# Patient Record
Sex: FEMALE | HISPANIC OR LATINO | URBAN - METROPOLITAN AREA
[De-identification: names, ages, dates, MRNs, and addresses within clinical notes are randomized per-mention and may not be internally consistent; named-entity substitution may affect disease eponyms.]

---

## 2021-06-15 ENCOUNTER — TRANSFERRED RECORDS (OUTPATIENT)
Dept: HEALTH INFORMATION MANAGEMENT | Facility: CLINIC | Age: 36
End: 2021-06-15

## 2022-08-30 ENCOUNTER — TRANSFERRED RECORDS (OUTPATIENT)
Dept: MULTI SPECIALTY CLINIC | Facility: CLINIC | Age: 37
End: 2022-08-30

## 2022-08-30 LAB
HPV ABSTRACT: NORMAL
PAP-ABSTRACT: NORMAL

## 2024-02-27 ENCOUNTER — OFFICE VISIT (OUTPATIENT)
Dept: FAMILY MEDICINE | Facility: CLINIC | Age: 39
End: 2024-02-27
Payer: COMMERCIAL

## 2024-02-27 VITALS
HEIGHT: 66 IN | RESPIRATION RATE: 15 BRPM | HEART RATE: 63 BPM | WEIGHT: 280 LBS | TEMPERATURE: 98.1 F | OXYGEN SATURATION: 97 % | BODY MASS INDEX: 45 KG/M2 | DIASTOLIC BLOOD PRESSURE: 80 MMHG | SYSTOLIC BLOOD PRESSURE: 118 MMHG

## 2024-02-27 DIAGNOSIS — B35.1 TOENAIL FUNGUS: ICD-10-CM

## 2024-02-27 DIAGNOSIS — E06.3 HYPOTHYROIDISM DUE TO HASHIMOTO'S THYROIDITIS: Primary | ICD-10-CM

## 2024-02-27 DIAGNOSIS — G43.109 MIGRAINE WITH AURA AND WITHOUT STATUS MIGRAINOSUS, NOT INTRACTABLE: ICD-10-CM

## 2024-02-27 DIAGNOSIS — J30.2 SEASONAL ALLERGIC RHINITIS, UNSPECIFIED TRIGGER: ICD-10-CM

## 2024-02-27 DIAGNOSIS — Z76.89 ENCOUNTER TO ESTABLISH CARE: ICD-10-CM

## 2024-02-27 DIAGNOSIS — Z87.898 HISTORY OF PREDIABETES: ICD-10-CM

## 2024-02-27 LAB
CHOLEST SERPL-MCNC: 160 MG/DL
FASTING STATUS PATIENT QL REPORTED: ABNORMAL
HBA1C MFR BLD: 5.7 % (ref 0–5.6)
HDLC SERPL-MCNC: 48 MG/DL
LDLC SERPL CALC-MCNC: 92 MG/DL
NONHDLC SERPL-MCNC: 112 MG/DL
T4 FREE SERPL-MCNC: 1.34 NG/DL (ref 0.9–1.7)
TRIGL SERPL-MCNC: 99 MG/DL
TSH SERPL DL<=0.005 MIU/L-ACNC: 0.23 UIU/ML (ref 0.3–4.2)

## 2024-02-27 PROCEDURE — 83036 HEMOGLOBIN GLYCOSYLATED A1C: CPT

## 2024-02-27 PROCEDURE — 84443 ASSAY THYROID STIM HORMONE: CPT

## 2024-02-27 PROCEDURE — 36415 COLL VENOUS BLD VENIPUNCTURE: CPT

## 2024-02-27 PROCEDURE — 84439 ASSAY OF FREE THYROXINE: CPT

## 2024-02-27 PROCEDURE — 99204 OFFICE O/P NEW MOD 45 MIN: CPT | Mod: GC

## 2024-02-27 PROCEDURE — 80061 LIPID PANEL: CPT

## 2024-02-27 RX ORDER — LEVOTHYROXINE SODIUM 150 UG/1
150 TABLET ORAL DAILY
Qty: 30 TABLET | Refills: 2 | Status: SHIPPED | OUTPATIENT
Start: 2024-02-27 | End: 2024-03-01 | Stop reason: DRUGHIGH

## 2024-02-27 RX ORDER — UREA 200 MG/G
CREAM TOPICAL DAILY
Qty: 120 G | Refills: 2 | Status: SHIPPED | OUTPATIENT
Start: 2024-02-27

## 2024-02-27 RX ORDER — LORATADINE 10 MG/1
10 TABLET ORAL DAILY
Qty: 90 TABLET | Refills: 3 | Status: SHIPPED | OUTPATIENT
Start: 2024-02-27

## 2024-02-27 RX ORDER — FLUTICASONE PROPIONATE 50 MCG
1 SPRAY, SUSPENSION (ML) NASAL DAILY
Qty: 18.2 ML | Refills: 11 | Status: SHIPPED | OUTPATIENT
Start: 2024-02-27

## 2024-02-27 NOTE — PATIENT INSTRUCTIONS
Patient Education   Here is the plan from today's visit    1. Encounter to establish care      2. Hypothyroidism due to Hashimoto's thyroiditis  - TSH today   - Will send you prescription    3. Seasonal allergic rhinitis, unspecified trigger    - loratadine (CLARITIN) 10 MG tablet; Take 1 tablet (10 mg) by mouth daily  Dispense: 90 tablet; Refill: 3  - fluticasone (FLONASE) 50 MCG/ACT nasal spray; Spray 1 spray into both nostrils daily  Dispense: 18.2 mL; Refill: 11    4. Toenail fungus  Apply a thick layer of topical urea 40% cream or ointment to the nail    ?Occlude the nail with a bandage or tape and leave on overnight    ?Wash the topical urea off with soap and water in the morning    ?Repeat this procedure nightly until the nail softens and can be easily clipped or debrided      5. Migraine with aura and without status migrainosus, not intractable        Please call or return to clinic if your symptoms don't go away.    Follow up plan  No follow-ups on file.    Thank you for coming to [facility] today.  Lab Testing:  **If you had lab testing today and your results are reassuring or normal they will be mailed to you or sent through MiCarga within 7 days.   **If the lab tests need quick action we will call you with the results.  **If you are having labs done on a different day, please call [phone] to schedule at [facility] or [phone] for other [facility] Outpatient Lab locations. Labs do not offer walk-in appointments.  The phone number we will call with results is # [phone] (home) . If this is not the best number please call our clinic and change the number.  Medication Refills:  If you need any refills please call your pharmacy and they will contact us.   If you need to  your refill at a new pharmacy, please contact the new pharmacy directly. The new pharmacy will help you get your medications transferred faster.   Scheduling:  If you have any concerns about today's visit or  wish to schedule another appointment please call our office during normal business hours [phone] (8-5:00 M-F). If you can no longer make a scheduled visit, please cancel via Modiv Media or call us to cancel.   If a referral was made to an [facility] specialty provider and you do not get a call from central scheduling, please refer to directions on your visit summary or call our office during normal business hours for assistance.   If a Mammogram was ordered for you at the Breast Center call [phone] to schedule or change your appointment.  If you had an XRay/CT/Ultrasound/MRI ordered the number is [phone] to schedule or change your radiology appointment.   [facility] has limited ultrasound appointments available on Wednesdays, if you would like your ultrasound at [facility], please call [phone] to schedule.   Medical Concerns:  If you have urgent medical concerns please call [phone] at any time of the day.    Paula Schwartz, DO

## 2024-02-27 NOTE — COMMUNITY RESOURCES LIST (ENGLISH)
02/27/2024   [facility]  N/A  For questions about this resource list or additional care needs, please contact your primary care clinic or care manager.  Phone: [phone]   Email: N/A   Address: [address] [place] [id number]   Hours: N/A        Food and Nutrition       Food pantry  1  [facility] Distance: 0.75 miles      In-Person   [address] [place] [address]  Language: English  Hours: Mon - Thu 1:00 PM - 4:30 PM , Fri 1:30 PM - 3:00 PM  Fees: Free   Phone: (362) 747-2971 Email: Roni@[email or web address]. Website: https://www.[employer]/     2  [facility] Distance: 0.86 miles      [facility]   [address] [place] [address]  Language: English  Hours: Tue 5:00 PM - 6:30 PM  Fees: Free   Phone: (624) 657-3609 Email: office@[facility].Tower59 Website: http://www.Nexidia.Tower59/     SNAP application assistance  3  [facility] - Office of Multicultural Services Distance: 0.53 miles      Phone/Virtual   [address] [place] [address]  Language: American Sign Language, Polish, Chinese, English, Syriac, Sinhala, Oromo, Bulgarian, Barbadian, Angolan, Swahili, Slovenian, Japanese  Hours: Mon - Tue 9:00 AM - 4:00 PM , Wed 10:00 AM - 5:00 PM , Thu - Fri 9:00 AM - 4:00 PM  Fees: Free   Phone: (323) 262-2293 Email: oms@[employer]. Website: http://www.[facility]/residents/human-services/multi-cultural-services     4  [employer] Distance: 1.42 miles      In-Person   [address] [place] [address]  Language: English, Angolan  Hours: Mon - Fri 8:30 AM - 5:00 PM  Fees: Free   Phone: (400) 131-5768 Email: info@clues.org Website: http://www.clues.org/     Soup kitchen or free meals  5  [facility] [place] [facility] [place] [facility] [place] Distance: 0.91 miles      In-Person   [address] [place] [address]  Language: English  Hours: Mon - Wed 12:00 PM - 1:00 PM  Fees: Free    Phone: (536) 500-2898 Email: asha@[facility].org Website: https://centralusa.[facility] Center[facility].org/[facility]/Adonis/     6  [facility] [place] [facility] [place] Distance: 1.38 miles      [facility]   [address] [place] [address]  Language: English, Albanian  Hours: Mon - Thu 5:15 PM - 6:15 PM  Fees: Free   Phone: (326) 952-7525 Ext.214 Email: gngghsaqfd1382@Synker Website: http://www.Hypemarks.org/          Important Numbers & Websites       Emergency Services   911  [facility]   311  Poison Control   (378) 783-3315  Suicide Prevention Lifeline   (536) 616-6353 (TALK)  Child Abuse Hotline   (586) 657-2746 (4-A-Child)  Sexual Assault Hotline   (369) 483-3780 (HOPE)  National Runaway Safeline   (182) 626-8029 (RUNAWAY)  All-Options Talkline   (600) 125-3484  Substance Abuse Referral   (530) 493-2650 (HELP)

## 2024-02-27 NOTE — PROGRESS NOTES
"    Assessment & Plan     Encounter to establish care  Here to establish care, heard about [facility] through her girlfriend.    Hypothyroidism due to Hashimoto's thyroiditis  History of prediabetes  Longstanding history of hypothyroidism verified by chart review current dose is 150 mcg of levothyroxine.  Patient has not had a TSH in 3-year.  Plan to check TSH along with lipids and A1c due to comorbidities.  No thyromegaly on exam.  - TSH with free T4 reflex; Future  - Lipid panel reflex to direct LDL Non-fasting; Future  - Hemoglobin A1c; Future  - levothyroxine (SYNTHROID/LEVOTHROID) 150 MCG tablet; Take 1 tablet (150 mcg) by mouth daily    Seasonal allergic rhinitis, unspecified trigger  Chronic history of seasonal allergies with good response to Claritin and Flonase as needed.  - loratadine (CLARITIN) 10 MG tablet; Take 1 tablet (10 mg) by mouth daily  - fluticasone (FLONASE) 50 MCG/ACT nasal spray; Spray 1 spray into both nostrils daily    Toenail fungus  Chronic onychomycosis of right second toe, appear discoloration and trophic nail changes, associated pain at times..  Advised patient to use topical urea cream daily at night and cover with a Band-Aid to soften nail bed in order to cut it.  Instructions provided in AVS.  Provided information about terbinafine to patient, will consider that treatment option if failure with conservative measures.  - urea (GORMEL) 20 % external cream; Apply topically daily    Migraine with aura and without status migrainosus, not intractable  Longstanding history of migraines with aura that is responsive to NSAIDs, triptans, and sleep.  Triggers include stress and possibly orgasms.  Last migraine was couple months ago and lasted for 1 day.  Has not had migraines since.  Does not require refills for triptan or NSAIDs.    BMI  Estimated body mass index is 45.19 kg/m  as calculated from the following:    Height as of this encounter: 1.676 m (5' 6\").    Weight as of this " "encounter: 127 kg (280 lb).   Weight management plan: Patient was given information about weight-neutral RD         Return for Follow up, Routine preventive, with me next avail.    Melanie Catalan is a 38 year old, presenting for the following health issues:  Establish Care (Refills)        2/27/2024    10:06 AM   Additional Questions   Roomed by Brendain   Accompanied by Self         2/27/2024    Information    services provided? No     Via the Health Maintenance questionnaire, the patient has reported the following services have been completed -Cervical Cancer Screening, this information has been sent to the abstraction team.  Hypothyroidism  - 150 mcg of Levothyroxine has been taking this for years( started at 86, numerous dose adjustments)   - 2012, after birth of child   - fatigue, gets cold frequently  - denies hair loss, diarrhea, constipation  - takes medication daily on empty stomach, waits 30 mins before eating, and infrequently misses doses.     Migraines   - Associated with pain   - With aura (few months ago, treated with triptans)  - Mild, every months can be debilitating, but is responsive to medication and sleep   - Orgasm with migraine   - \"few and far between\"   - has triptans  - follows with OP neurologist rarely.    Varicose Veins   - Hx of DVT after birth of child - 2016.   - 1 time shot of anticoagulant, did not require any PO anticoagulation after    Toenail Fungus   - long standing hx   - thickened toe nail on R 2nd toe   - can be painful sometimes   - hasn't tried OTC     Family life   - Is    - Polyamorous  -  with a partner and has a girlfriend separately     [facility]  - Partial Day program  - 8 weeks  - Binge eating disorder  - Has group therapy with them 1 time/week     Birth Control   - Has norethindrone has been taking it chronically   - Does not require refills         Review of Systems  Constitutional, HEENT, cardiovascular, pulmonary, GI, " ", musculoskeletal, neuro, skin, endocrine and psych systems are negative, except as otherwise noted.      Objective    /80 (BP Location: Left arm, Patient Position: Sitting, Cuff Size: Adult Large)   Pulse 63   Temp 98.1  F (36.7  C)   Resp 15   Ht 1.676 m (5' 6\")   Wt 127 kg (280 lb)   LMP 02/25/2024 (Exact Date)   SpO2 97%   BMI 45.19 kg/m    Body mass index is 45.19 kg/m .  Physical Exam  HENT:      Head: Normocephalic and atraumatic.      Nose: Congestion present.      Mouth/Throat:      Mouth: Mucous membranes are moist.   Neck:      Thyroid: No thyromegaly.   Cardiovascular:      Rate and Rhythm: Normal rate and regular rhythm.   Pulmonary:      Effort: Pulmonary effort is normal.      Breath sounds: Normal breath sounds.   Musculoskeletal:      Cervical back: Neck supple.   Lymphadenopathy:      Cervical: No cervical adenopathy.   Skin:     General: Skin is warm.      Capillary Refill: Capillary refill takes less than 2 seconds.   Neurological:      General: No focal deficit present.      Mental Status: She is alert.      Gait: Gait is intact.   Psychiatric:         Mood and Affect: Mood normal.         Behavior: Behavior normal.         Thought Content: Thought content normal.         Judgment: Judgment normal.            Signed Electronically by: Paula Schwartz DO"

## 2024-02-27 NOTE — PROGRESS NOTES
Preceptor Attestation:   Patient seen, evaluated and discussed with the resident. I have verified the content of the note, which accurately reflects my assessment of the patient and the plan of care.   Supervising Physician:  Dewey Schmitt MD

## 2024-03-01 DIAGNOSIS — E06.3 HYPOTHYROIDISM DUE TO HASHIMOTO'S THYROIDITIS: Primary | ICD-10-CM

## 2024-03-01 RX ORDER — LEVOTHYROXINE SODIUM 137 UG/1
137 TABLET ORAL DAILY
Qty: 42 TABLET | Refills: 0 | Status: SHIPPED | OUTPATIENT
Start: 2024-03-01 | End: 2024-04-26

## 2024-03-01 NOTE — RESULT ENCOUNTER NOTE
Dear Alayna  Here are your result(s). Your lipid panel looks normal. Your good cholestenol is a little low which is common among people who have hypothyroidism. It is not too low where we would consider medication. Your lab results indicate that you have prediabetes. We can talk about what this means more at your next visit and think about if medication is right for you. Let me know if you have any questions.   Paula

## 2024-03-26 ENCOUNTER — OFFICE VISIT (OUTPATIENT)
Dept: FAMILY MEDICINE | Facility: CLINIC | Age: 39
End: 2024-03-26
Payer: COMMERCIAL

## 2024-03-26 VITALS
HEART RATE: 68 BPM | DIASTOLIC BLOOD PRESSURE: 78 MMHG | RESPIRATION RATE: 16 BRPM | OXYGEN SATURATION: 97 % | TEMPERATURE: 97.7 F | SYSTOLIC BLOOD PRESSURE: 110 MMHG | BODY MASS INDEX: 45.19 KG/M2 | HEIGHT: 66 IN

## 2024-03-26 DIAGNOSIS — R59.9 ENLARGED LYMPH NODES: Primary | ICD-10-CM

## 2024-03-26 PROCEDURE — 99213 OFFICE O/P EST LOW 20 MIN: CPT | Mod: GC

## 2024-03-26 NOTE — PROGRESS NOTES
"  Assessment & Plan     Enlarged lymph nodes  Month long hx of noted lymph node that is neither painful nor enlarging per hx - known illness in recent hx. On exam singular shoddy lymph node noted along R SCM that is mobile, non tender to touch. Likely reactive 2/2 to recent infectious processes and anticipate resolution over couple weeks. Return precautions reviewed and re-eval in 4-6 weeks.       Return in about 6 weeks (around 5/7/2024) for lymph node fu .    Melanie Catalan is a 38 year old, presenting for the following health issues:  Mass (On neck - first noticed few months)        3/26/2024     3:03 PM   Additional Questions   Roomed by virgil   Accompanied by girlfriend luis enrique         3/26/2024    Information    services provided? No     HPI     Swollen Lymph Node  - on her radar for couple months  - chiropractor brought it to her attention 2-3 weeks ago, goes 2x a month   - has 4 kids - always sick   - not painful, not getting bigger - maybe decreased in size   - does not affect ROM of neck   - denies HA, fevers, chills, night sweats, weight loss  - denies CP, dyspnea, arthralgias, no new fatigue   - FDLMP  - had strep throat- finished antibiotics 2 weeks         Review of Systems  Constitutional, HEENT, cardiovascular, pulmonary, GI, , musculoskeletal, neuro, skin, endocrine and psych systems are negative, except as otherwise noted.      Objective    /78 (BP Location: Right arm, Patient Position: Sitting, Cuff Size: Adult Large)   Pulse 68   Temp 97.7  F (36.5  C) (Oral)   Resp 16   Ht 1.676 m (5' 6\")   LMP 03/18/2024 (Exact Date)   SpO2 97%   BMI 45.19 kg/m    Body mass index is 45.19 kg/m .  Physical Exam  Vitals reviewed.   Constitutional:       Appearance: Normal appearance.   HENT:      Right Ear: Tympanic membrane, ear canal and external ear normal.      Left Ear: Tympanic membrane, ear canal and external ear normal.      Mouth/Throat:      Mouth: Mucous " membranes are moist.      Pharynx: No oropharyngeal exudate or posterior oropharyngeal erythema.   Eyes:      Conjunctiva/sclera: Conjunctivae normal.   Neck:        Comments: Shoddy mobile 1/2 cm lymph node along R SCM.   Cardiovascular:      Rate and Rhythm: Normal rate and regular rhythm.   Pulmonary:      Effort: Pulmonary effort is normal.      Breath sounds: Normal breath sounds.   Musculoskeletal:      Cervical back: Full passive range of motion without pain, normal range of motion and neck supple. No rigidity.   Skin:     Capillary Refill: Capillary refill takes less than 2 seconds.   Neurological:      General: No focal deficit present.      Mental Status: She is alert.                Signed Electronically by: Paula Schwartz DO

## 2024-03-26 NOTE — PROGRESS NOTES
Preceptor Attestation:   Patient seen, evaluated and discussed with the resident. I have verified the content of the note, which accurately reflects my assessment of the patient and the plan of care.   Supervising Physician:  Christina Graham MD

## 2024-03-27 NOTE — PATIENT INSTRUCTIONS
Patient Education   Here is the plan from today's visit    1. Enlarged lymph nodes        Please call or return to clinic if your symptoms don't go away.    Follow up plan  Return in about 6 weeks (around 5/7/2024) for lymph node fu .    Thank you for coming to [facility] today.  Lab Testing:  **If you had lab testing today and your results are reassuring or normal they will be mailed to you or sent through Isabella Oliver within 7 days.   **If the lab tests need quick action we will call you with the results.  **If you are having labs done on a different day, please call [phone] to schedule at [facility] or [phone] for other [facility] Outpatient Lab locations. Labs do not offer walk-in appointments.  The phone number we will call with results is # [phone] (home) . If this is not the best number please call our clinic and change the number.  Medication Refills:  If you need any refills please call your pharmacy and they will contact us.   If you need to  your refill at a new pharmacy, please contact the new pharmacy directly. The new pharmacy will help you get your medications transferred faster.   Scheduling:  If you have any concerns about today's visit or wish to schedule another appointment please call our office during normal business hours [phone] (8-5:00 M-F). If you can no longer make a scheduled visit, please cancel via Isabella Oliver or call us to cancel.   If a referral was made to an [facility] specialty provider and you do not get a call from central scheduling, please refer to directions on your visit summary or call our office during normal business hours for assistance.   If a Mammogram was ordered for you at the Breast Center call [phone] to schedule or change your appointment.  If you had an XRay/CT/Ultrasound/MRI ordered the number is [phone] to schedule or change your radiology appointment.   [facility] has limited ultrasound appointments available  on Wednesdays, if you would like your ultrasound at [facility], please call [phone] to schedule.   Medical Concerns:  If you have urgent medical concerns please call [phone] at any time of the day.    Paula Schwartz, DO

## 2024-04-18 ENCOUNTER — TELEPHONE (OUTPATIENT)
Dept: FAMILY MEDICINE | Facility: CLINIC | Age: 39
End: 2024-04-18
Payer: COMMERCIAL

## 2024-04-18 DIAGNOSIS — Z30.41 ENCOUNTER FOR SURVEILLANCE OF CONTRACEPTIVE PILLS: Primary | ICD-10-CM

## 2024-04-18 NOTE — TELEPHONE ENCOUNTER
Norethindrone  Dose: .35mg      This medication is not on patients med list, looks like she was seeing OB/GYN provider last year perhaps they prescribed. Was this discussed with provider?    Alexsandra Matias RN

## 2024-04-18 NOTE — TELEPHONE ENCOUNTER
[facility] phone call message- medication clarification/question:    Full Medication Name:  Norethindrone  Dose: .35mg     Question: Patient states this is the birth control she uses, does not see it on med list but she would like a refill. Patient states she has 2 days left and needs it as soon as possible.     Pharmacy confirmed as    [id number] - [address]: Yes    OK to leave a message on voice mail? Yes    Primary language: English      needed? No    Call taken on April 18, 2024 at 10:26 AM by Melissa Vazquez

## 2024-04-22 RX ORDER — ACETAMINOPHEN AND CODEINE PHOSPHATE 120; 12 MG/5ML; MG/5ML
0.35 SOLUTION ORAL DAILY
Qty: 90 TABLET | Refills: 3 | Status: SHIPPED | OUTPATIENT
Start: 2024-04-22 | End: 2024-06-10 | Stop reason: ALTCHOICE

## 2024-04-22 RX ORDER — ACETAMINOPHEN AND CODEINE PHOSPHATE 120; 12 MG/5ML; MG/5ML
0.35 SOLUTION ORAL DAILY
COMMUNITY
Start: 2023-04-11 | End: 2024-04-22

## 2024-04-22 NOTE — TELEPHONE ENCOUNTER
RN discussed with preceptor who sent in refill for medication. Called pt back, no answer, lmtcb. If RN is not available please let pt know a refill was sent.    Alethea Beebe RN

## 2024-04-22 NOTE — TELEPHONE ENCOUNTER
[facility] phone call message- general phone call:    Reason for call: Patient is requesting a call back in regards to this medication refill. She states it is urgent because she is all out and needs it refilled as soon as possible.    Return call needed: Yes    OK to leave a message on voice mail? Yes, very detailed    Primary language: English      needed? No    Call taken on April 22, 2024 at 11:00 AM by Melissa Vazquez

## 2024-04-26 ENCOUNTER — OFFICE VISIT (OUTPATIENT)
Dept: FAMILY MEDICINE | Facility: CLINIC | Age: 39
End: 2024-04-26
Payer: COMMERCIAL

## 2024-04-26 VITALS
HEIGHT: 66 IN | DIASTOLIC BLOOD PRESSURE: 79 MMHG | TEMPERATURE: 98.1 F | BODY MASS INDEX: 44.2 KG/M2 | SYSTOLIC BLOOD PRESSURE: 116 MMHG | OXYGEN SATURATION: 97 % | RESPIRATION RATE: 16 BRPM | WEIGHT: 275 LBS | HEART RATE: 62 BPM

## 2024-04-26 DIAGNOSIS — Z11.4 SCREENING FOR HIV (HUMAN IMMUNODEFICIENCY VIRUS): ICD-10-CM

## 2024-04-26 DIAGNOSIS — Z00.00 ROUTINE GENERAL MEDICAL EXAMINATION AT A HEALTH CARE FACILITY: ICD-10-CM

## 2024-04-26 DIAGNOSIS — Z12.31 VISIT FOR SCREENING MAMMOGRAM: Primary | ICD-10-CM

## 2024-04-26 DIAGNOSIS — Z11.3 SCREENING EXAMINATION FOR VENEREAL DISEASE: ICD-10-CM

## 2024-04-26 DIAGNOSIS — E06.3 HYPOTHYROIDISM DUE TO HASHIMOTO'S THYROIDITIS: ICD-10-CM

## 2024-04-26 DIAGNOSIS — Z13.9 ENCOUNTER FOR SCREENING INVOLVING SOCIAL DETERMINANTS OF HEALTH (SDOH): ICD-10-CM

## 2024-04-26 DIAGNOSIS — Z11.59 NEED FOR HEPATITIS C SCREENING TEST: ICD-10-CM

## 2024-04-26 LAB
ALBUMIN UR-MCNC: NEGATIVE MG/DL
APPEARANCE UR: CLEAR
BACTERIA #/AREA URNS HPF: ABNORMAL /HPF
BILIRUB UR QL STRIP: NEGATIVE
CLUE CELLS: ABNORMAL
COLOR UR AUTO: YELLOW
GLUCOSE UR STRIP-MCNC: NEGATIVE MG/DL
HGB UR QL STRIP: ABNORMAL
KETONES UR STRIP-MCNC: NEGATIVE MG/DL
LEUKOCYTE ESTERASE UR QL STRIP: ABNORMAL
NITRATE UR QL: NEGATIVE
PH UR STRIP: 5.5 [PH] (ref 5–8)
RBC #/AREA URNS AUTO: ABNORMAL /HPF
SP GR UR STRIP: >=1.03 (ref 1–1.03)
SQUAMOUS #/AREA URNS AUTO: ABNORMAL /LPF
TRICHOMONAS, WET PREP: ABNORMAL
UROBILINOGEN UR STRIP-ACNC: 0.2 E.U./DL
WBC #/AREA URNS AUTO: ABNORMAL /HPF
WBC'S/HIGH POWER FIELD, WET PREP: ABNORMAL
YEAST, WET PREP: ABNORMAL

## 2024-04-26 PROCEDURE — 81001 URINALYSIS AUTO W/SCOPE: CPT

## 2024-04-26 PROCEDURE — 86803 HEPATITIS C AB TEST: CPT

## 2024-04-26 PROCEDURE — 86780 TREPONEMA PALLIDUM: CPT

## 2024-04-26 PROCEDURE — 84443 ASSAY THYROID STIM HORMONE: CPT

## 2024-04-26 PROCEDURE — 99396 PREV VISIT EST AGE 40-64: CPT | Mod: 25

## 2024-04-26 PROCEDURE — 87491 CHLMYD TRACH DNA AMP PROBE: CPT

## 2024-04-26 PROCEDURE — 87591 N.GONORRHOEAE DNA AMP PROB: CPT

## 2024-04-26 PROCEDURE — 36415 COLL VENOUS BLD VENIPUNCTURE: CPT

## 2024-04-26 PROCEDURE — 87086 URINE CULTURE/COLONY COUNT: CPT

## 2024-04-26 PROCEDURE — 87389 HIV-1 AG W/HIV-1&-2 AB AG IA: CPT

## 2024-04-26 PROCEDURE — 87210 SMEAR WET MOUNT SALINE/INK: CPT

## 2024-04-26 RX ORDER — INDOMETHACIN 25 MG/1
25 CAPSULE ORAL PRN
COMMUNITY
Start: 2023-06-03 | End: 2024-07-19

## 2024-04-26 RX ORDER — DEXTROAMPHETAMINE SACCHARATE, AMPHETAMINE ASPARTATE MONOHYDRATE, DEXTROAMPHETAMINE SULFATE AND AMPHETAMINE SULFATE 2.5; 2.5; 2.5; 2.5 MG/1; MG/1; MG/1; MG/1
10 CAPSULE, EXTENDED RELEASE ORAL DAILY
COMMUNITY
Start: 2023-07-28

## 2024-04-26 RX ORDER — LEVOTHYROXINE SODIUM 137 UG/1
137 TABLET ORAL DAILY
Qty: 10 TABLET | Refills: 0 | Status: SHIPPED | OUTPATIENT
Start: 2024-04-26 | End: 2024-04-29

## 2024-04-26 RX ORDER — RIZATRIPTAN BENZOATE 10 MG/1
10 TABLET ORAL
COMMUNITY
Start: 2023-06-03 | End: 2024-07-19

## 2024-04-26 RX ORDER — CLOTRIMAZOLE AND BETAMETHASONE DIPROPIONATE 10; .64 MG/G; MG/G
CREAM TOPICAL 2 TIMES DAILY
COMMUNITY
Start: 2023-09-07

## 2024-04-26 RX ORDER — HYDROXYZINE HYDROCHLORIDE 10 MG/1
10 TABLET, FILM COATED ORAL EVERY 6 HOURS PRN
COMMUNITY
Start: 2022-05-26

## 2024-04-26 SDOH — HEALTH STABILITY: PHYSICAL HEALTH: ON AVERAGE, HOW MANY MINUTES DO YOU ENGAGE IN EXERCISE AT THIS LEVEL?: 30 MIN

## 2024-04-26 SDOH — HEALTH STABILITY: PHYSICAL HEALTH: ON AVERAGE, HOW MANY DAYS PER WEEK DO YOU ENGAGE IN MODERATE TO STRENUOUS EXERCISE (LIKE A BRISK WALK)?: 3 DAYS

## 2024-04-26 ASSESSMENT — SOCIAL DETERMINANTS OF HEALTH (SDOH): HOW OFTEN DO YOU GET TOGETHER WITH FRIENDS OR RELATIVES?: MORE THAN THREE TIMES A WEEK

## 2024-04-26 NOTE — COMMUNITY RESOURCES LIST (ENGLISH)
April 26, 2024           YOUR PERSONALIZED LIST OF SERVICES & PROGRAMS           NAVIGATION    Eligibility Screening      [facility] - Health insurance application assistance - [facility]  [address] [place] [address] (Distance: 0.5 miles)  Phone: [phone]  Language: English  Fee: Free  Accessibility: Translation services, Ada accessible      [facility] application assistance [facility]  [address] [place] [address] (Distance: 0.5 miles)  Phone: [phone]) [phone]  Language: English  Fee: Free  Accessibility: Translation services, Ada accessible      [employer]ators  Phone: (800) 424-1897  Website: https://www.mnsure.org/about-us/assister-program/navigators/index.jsp  Language: English  Hours: Mon 8:00 AM - 4:00 PM Tue 8:00 AM - 4:00 PM Wed 8:00 AM - 4:00 PM Thu 8:00 AM - 4:00 PM        ASSISTANCE    Nutrition Benefits      [facility] application assistance [facility]  [address] [place] [address] (Distance: 0.5 miles)  Phone: (844) 622-8081  Language: English  Fee: Free  Accessibility: Translation services, Ada accessible      [facility] application assistance [facility]  [address] [place] [address] (Distance: 0.5 miles)  Phone: (344) 431-9776  Language: English  Fee: Free  Accessibility: Translation services, Ada accessible      [employer] [place] - SNAP (formerly food stamps) Screening and Application help  Phone: (623) 909-1148  Website: https://www.hungersolutions.org/programs/[place]-food-helpline/  Language: English  Hours: Mon 10:00 AM - 5:00 PM Tue 10:00 AM - 5:00 PM Wed 10:00 AM - 5:00 PM Thu 10:00 AM - 5:00 PM Fri 10:00 AM - 5:00 PM  Fee: Free  Accessibility: Ada accessible, Blind accommodation, Deaf or hard of hearing, Translation services    Pantry      [facility] - Food pantry  [address] [place] [address] (Distance: 2.9 miles)  Phone: (694)  812-8454  Website: http://www.saintolaf.[facility]/  Language: English  Fee: Free  Accessibility: Ada accessible      [employer] - Food pantry  [address] [place] [address] (Distance: 6.7 miles)  Phone: (549) 567-8314  Website: http://www.Coolio.[facility]/  Language: English, Malay  Fee: Free  Accessibility: Ada accessible      [employer]  Phone: (790) 274-8630  Website: https://wwwFace-Me/empowerment-food-bank  Language: English  Hours: Mon 9:00 AM - 5:00 PM Tue 9:00 AM - 5:00 PM Wed 9:00 AM - 5:00 PM Thu 9:00 AM - 5:00 PM Fri 9:00 AM - 5:00 PM  Fee: Free               IMPORTANT NUMBERS & WEBSITES        Emergency Services  911  .   [facility]  211 http://211Staatsburgway.org  .   Poison Control  (120) 460-1513 http://[place]poison.org http://wisconsinpoison.org  .     Suicide and Crisis Lifeline  988 http://988Zurffline.org  .   Childhelp [place] Child Abuse Hotline  505.757.8971 http://Childhelphotline.org   .   National Sexual Assault Hotline  (262) 442-1672 (HOPE) http://CINEPASSn.org   .     National Runaway Safeline  (489) 674-6204 (RUNAWAY) http://Coda AutomotiveruEko.org  .   Pregnancy & Postpartum Support  Call/text 434-368-8443  [place]: http://ppsupportmn.org  [place]: http://Dream Kitchen.com/wi  .   Substance Abuse National Helpline ([facility])  461-070-HELP (7423) http://Findtreatment.gov   .                DISCLAIMER: These resources have been generated via the [employer] Platform. [employer] does not endorse any service providers mentioned in this resource list. [employer] does not guarantee that the services mentioned in this resource list will be available to you or will improve your health or wellness.    [facility]

## 2024-04-26 NOTE — PATIENT INSTRUCTIONS
Preventive Care Advice   This is general advice given by our system to help you stay healthy. However, your care team may have specific advice just for you. Please talk to your care team about your preventive care needs.  Nutrition  Eat 5 or more servings of fruits and vegetables each day.  Try wheat bread, brown rice and whole grain pasta (instead of white bread, rice, and pasta).  Get enough calcium and vitamin D. Check the label on foods and aim for 100% of the RDA (recommended daily allowance).  Lifestyle  Exercise at least 150 minutes each week   (30 minutes a day, 5 days a week).  Do muscle strengthening activities 2 days a week. These help control your weight and prevent disease.  No smoking.  Wear sunscreen to prevent skin cancer.  Have a dental exam and cleaning every 6 months.  Yearly exams  See your health care team every year to talk about:  Any changes in your health.  Any medicines your care team has prescribed.  Preventive care, family planning, and ways to prevent chronic diseases.  Shots (vaccines)   HPV shots (up to age 26), if you've never had them before.  Hepatitis B shots (up to age 59), if you've never had them before.  COVID-19 shot: Get this shot when it's due.  Flu shot: Get a flu shot every year.  Tetanus shot: Get a tetanus shot every 10 years.  Pneumococcal, hepatitis A, and RSV shots: Ask your care team if you need these based on your risk.  Shingles shot (for age 50 and up).  General health tests  Diabetes screening:  Starting at age 35, Get screened for diabetes at least every 3 years.  If you are younger than age 35, ask your care team if you should be screened for diabetes.  Cholesterol test: At age 39, start having a cholesterol test every 5 years, or more often if advised.  Bone density scan (DEXA): At age 50, ask your care team if you should have this scan for osteoporosis (brittle bones).  Hepatitis C: Get tested at least once in your life.  STIs (sexually transmitted  infections)  Before age 24: Ask your care team if you should be screened for STIs.  After age 24: Get screened for STIs if you're at risk. You are at risk for STIs (including HIV) if:  You are sexually active with more than one person.  You don't use condoms every time.  You or a partner was diagnosed with a sexually transmitted infection.  If you are at risk for HIV, ask about PrEP medicine to prevent HIV.  Get tested for HIV at least once in your life, whether you are at risk for HIV or not.  Cancer screening tests  Cervical cancer screening: If you have a cervix, begin getting regular cervical cancer screening tests at age 21. Most people who have regular screenings with normal results can stop after age 65. Talk about this with your provider.  Breast cancer scan (mammogram): If you've ever had breasts, begin having regular mammograms starting at age 40. This is a scan to check for breast cancer.  Colon cancer screening: It is important to start screening for colon cancer at age 45.  Have a colonoscopy test every 10 years (or more often if you're at risk) Or, ask your provider about stool tests like a FIT test every year or Cologuard test every 3 years.  To learn more about your testing options, visit: https://www."[employer]/837610.pdf.  For help making a decision, visit: https://bit.ly/tu05731.  Prostate cancer screening test: If you have a prostate and are age 55 to 69, ask your provider if you would benefit from a yearly prostate cancer screening test.  Lung cancer screening: If you are a current or former smoker age 50 to 80, ask your care team if ongoing lung cancer screenings are right for you.  For informational purposes only. Not to replace the advice of your health care provider. Copyright   2023 [employer]. All rights reserved. Clinically reviewed by the [facility]. [facility] 103456 - REV 01/24.    Learning About Stress  What is stress?     Stress is your  body's response to a hard situation. Your body can have a physical, emotional, or mental response. Stress is a fact of life for most people, and it affects everyone differently. What causes stress for you may not be stressful for someone else.  A lot of things can cause stress. You may feel stress when you go on a job interview, take a test, or run a race. This kind of short-term stress is normal and even useful. It can help you if you need to work hard or react quickly. For example, stress can help you finish an important job on time.  Long-term stress is caused by ongoing stressful situations or events. Examples of long-term stress include long-term health problems, ongoing problems at work, or conflicts in your family. Long-term stress can harm your health.  How does stress affect your health?  When you are stressed, your body responds as though you are in danger. It makes hormones that speed up your heart, make you breathe faster, and give you a burst of energy. This is called the fight-or-flight stress response. If the stress is over quickly, your body goes back to normal and no harm is done.  But if stress happens too often or lasts too long, it can have bad effects. Long-term stress can make you more likely to get sick, and it can make symptoms of some diseases worse. If you tense up when you are stressed, you may develop neck, shoulder, or low back pain. Stress is linked to high blood pressure and heart disease.  Stress also harms your emotional health. It can make you hinds, tense, or depressed. Your relationships may suffer, and you may not do well at work or school.  What can you do to manage stress?  You can try these things to help manage stress:   Do something active. Exercise or activity can help reduce stress. Walking is a great way to get started. Even everyday activities such as housecleaning or yard work can help.  Try yoga or kaur chi. These techniques combine exercise and meditation. You may need  some training at first to learn them.  Do something you enjoy. For example, listen to music or go to a movie. Practice your hobby or do volunteer work.  Meditate. This can help you relax, because you are not worrying about what happened before or what may happen in the future.  Do guided imagery. Imagine yourself in any setting that helps you feel calm. You can use online videos, books, or a teacher to guide you.  Do breathing exercises. For example:  From a standing position, bend forward from the waist with your knees slightly bent. Let your arms dangle close to the floor.  Breathe in slowly and deeply as you return to a standing position. Roll up slowly and lift your head last.  Hold your breath for just a few seconds in the standing position.  Breathe out slowly and bend forward from the waist.  Let your feelings out. Talk, laugh, cry, and express anger when you need to. Talking with supportive friends or family, a counselor, or a kahlil leader about your feelings is a healthy way to relieve stress. Avoid discussing your feelings with people who make you feel worse.  Write. It may help to write about things that are bothering you. This helps you find out how much stress you feel and what is causing it. When you know this, you can find better ways to cope.  What can you do to prevent stress?  You might try some of these things to help prevent stress:  Manage your time. This helps you find time to do the things you want and need to do.  Get enough sleep. Your body recovers from the stresses of the day while you are sleeping.  Get support. Your family, friends, and community can make a difference in how you experience stress.  Limit your news feed. Avoid or limit time on social media or news that may make you feel stressed.  Do something active. Exercise or activity can help reduce stress. Walking is a great way to get started.  Where can you learn more?  Go to https://www.healthwise.net/patiented  Enter N032 in the  "search box to learn more about \"Learning About Stress.\"  Current as of: October 24, 2023               Content Version: 14.0    9793-8432 [employer].   Care instructions adapted under license by your healthcare professional. If you have questions about a medical condition or this instruction, always ask your healthcare professional. [employer] disclaims any warranty or liability for your use of this information.      Substance Use Disorder: Care Instructions  Overview     You can improve your life and health by stopping your use of alcohol or drugs. When you don't drink or use drugs, you may feel and sleep better. You may get along better with your family, friends, and coworkers. There are medicines and programs that can help with substance use disorder.  How can you care for yourself at home?  Here are some ways to help you stay sober and prevent relapse.  If you have been given medicine to help keep you sober or reduce your cravings, be sure to take it exactly as prescribed.  Talk to your doctor about programs that can help you stop using drugs or drinking alcohol.  Do not keep alcohol or drugs in your home.  Plan ahead. Think about what you'll say if other people ask you to drink or use drugs. Try not to spend time with people who drink or use drugs.  Use the time and money spent on drinking or drugs to do something that's important to you.  Preventing a relapse  Have a plan to deal with relapse. Learn to recognize changes in your thinking that lead you to drink or use drugs. Get help before you start to drink or use drugs again.  Try to stay away from situations, friends, or places that may lead you to drink or use drugs.  If you feel the need to drink alcohol or use drugs again, seek help right away. Call a trusted friend or family member. Some people get support from organizations such as Narcotics Anonymous or [employer] or from treatment facilities.  If you relapse, get help " as soon as you can. Some people make a plan with another person that outlines what they want that person to do for them if they relapse. The plan usually includes how to handle the relapse and who to notify in case of relapse.  Don't give up. Remember that a relapse doesn't mean that you have failed. Use the experience to learn the triggers that lead you to drink or use drugs. Then quit again. Recovery is a lifelong process. Many people have several relapses before they are able to quit for good.  Follow-up care is a key part of your treatment and safety. Be sure to make and go to all appointments, and call your doctor if you are having problems. It's also a good idea to know your test results and keep a list of the medicines you take.  When should you call for help?   Call 911  anytime you think you may need emergency care. For example, call if you or someone else:    Has overdosed or has withdrawal signs. Be sure to tell the emergency workers that you are or someone else is using or trying to quit using drugs. Overdose or withdrawal signs may include:  Losing consciousness.  Seizure.  Seeing or hearing things that aren't there (hallucinations).     Is thinking or talking about suicide or harming others.   Where to get help 24 hours a day, 7 days a week   If you or someone you know talks about suicide, self-harm, a mental health crisis, a substance use crisis, or any other kind of emotional distress, get help right away. You can:    Call the Suicide and Crisis Lifeline at 987.     Call 7-884-671-TALK (1-463.213.1932).     Text HOME to 067079 to access the Crisis Text Line.   Consider saving these numbers in your phone.  Go to [employer]org for more information or to chat online.  Call your doctor now or seek immediate medical care if:    You are having withdrawal symptoms. These may include nausea or vomiting, sweating, shakiness, and anxiety.   Watch closely for changes in your health, and be sure to contact  "your doctor if:    You have a relapse.     You need more help or support to stop.   Where can you learn more?  Go to https://www.[employer].net/patiented  Enter H573 in the search box to learn more about \"Substance Use Disorder: Care Instructions.\"  Current as of: November 15, 2023               Content Version: 14.0    7098-3414 [employer].   Care instructions adapted under license by your healthcare professional. If you have questions about a medical condition or this instruction, always ask your healthcare professional. [employer] disclaims any warranty or liability for your use of this information.      Safer Sex: Care Instructions  Overview  Safer sex is a way to reduce your risk of getting a sexually transmitted infection (STI). It can also help prevent pregnancy.  Several products can help you practice safer sex and reduce your chance of STIs. One of the best is a condom. There are internal and external condoms. You can use a special rubber sheet (dental dam) for protection during oral sex. Disposable gloves can keep your hands from touching blood, semen, or other body fluids that can carry infections.  Remember that birth control methods such as diaphragms, IUDs, foams, and birth control pills do not stop you from getting STIs.  Follow-up care is a key part of your treatment and safety. Be sure to make and go to all appointments, and call your doctor if you are having problems. It's also a good idea to know your test results and keep a list of the medicines you take.  How can you care for yourself at home?  Think about getting vaccinated to help prevent hepatitis A, hepatitis B, and human papillomavirus (HPV). They can be spread through sex.  Use a condom every time you have sex. Use an external condom, which goes on the penis. Or use an internal condom, which goes into the vagina or anus.  Make sure you use the right size external condom. A condom that's too small can break " "easily. A condom that's too big can slip off during sex.  Use a new condom each time you have sex. Be careful not to poke a hole in the condom when you open the wrapper.  Don't use an internal condom and an external condom at the same time.  Never use petroleum jelly (such as Vaseline), grease, hand lotion, baby oil, or anything with oil in it. These products can make holes in the condom.  After intercourse, hold the edge of the condom as you remove it. This will help keep semen from spilling out of the condom.  Do not have sex with anyone who has symptoms of an STI, such as sores on the genitals or mouth.  Do not drink a lot of alcohol or use drugs before sex.  Limit your sex partners. Sex with one partner who has sex only with you can reduce your risk of getting an STI.  Don't share sex toys. But if you do share them, use a condom and clean the sex toys between each use.  Talk to any partners before you have sex. Talk about what you feel comfortable with and whether you have any boundaries with sex. And find out if your partner or partners may be at risk for any STI. Keep in mind that a person may be able to spread an STI even if they do not have symptoms. You and any partners may want to get tested for STIs.  Where can you learn more?  Go to https://www.[employer].net/patiented  Enter B608 in the search box to learn more about \"Safer Sex: Care Instructions.\"  Current as of: November 27, 2023               Content Version: 14.0    2837-5898 [employer].   Care instructions adapted under license by your healthcare professional. If you have questions about a medical condition or this instruction, always ask your healthcare professional. [employer] disclaims any warranty or liability for your use of this information.      Learning About Taking Medicine to Prevent HIV Infections  If you're at risk of being infected with HIV, talk to your doctor about pre-exposure prophylaxis (PrEP). When " "taken as directed, this medicine can help prevent you from getting HIV. While taking PrEP, you'll need to see your doctor and get regular HIV tests. If you're concerned about the cost of the medicine, there are programs that can help.  Why is it used?    Use PrEP to protect against HIV.    It almost always protects you from getting HIV through sex.  It reduces your risk of getting HIV from injecting drugs.    Take PrEP if you're trying to get pregnant with someone who has HIV.    It can help prevent you from getting HIV. This will protect you and your baby.  How do you take PrEP?    PrEP may be taken as a daily pill. It may also be given as a long-acting shot.    It's important to stay on schedule when taking PrEP. If you skip a pill or miss a shot appointment, PrEP doesn't work as well to block the virus.  Follow-up care is a key part of your treatment and safety. Be sure to make and go to all appointments, and call your doctor if you are having problems. It's also a good idea to know your test results and keep a list of the medicines you take.  Where can you learn more?  Go to https://www.[employer].net/patiented  Enter I152 in the search box to learn more about \"Learning About Taking Medicine to Prevent HIV Infections.\"  Current as of: June 12, 2023               Content Version: 14.0    4594-0804 [employer].   Care instructions adapted under license by your healthcare professional. If you have questions about a medical condition or this instruction, always ask your healthcare professional. [employer] disclaims any warranty or liability for your use of this information.      HIV: Care Instructions  Overview     Human immunodeficiency virus (HIV) is a virus that attacks your immune system. This makes it hard for your body to fight infection and disease. HIV is the virus that causes AIDS (acquired immunodeficiency syndrome). But having HIV doesn't mean that you have AIDS. AIDS is the " last stage of HIV infection, and with treatment, you can avoid it.  Medicines called antiretrovirals are the main treatment for HIV. By fighting the virus, these medicines can help your immune system stay healthy and can prevent AIDS. And they can help you live about as long as someone without HIV.  HIV often causes flu-like symptoms soon after a person gets infected. These early symptoms go away in a few weeks. After that, you may not have signs of illness for many years.  But the virus is still in your body. If you don't get treated, symptoms come back and then remain. Common symptoms include fatigue, weight loss, fever, night sweats, diarrhea, swollen lymph nodes, and mouth sores. If HIV progresses to AIDS, your symptoms get worse and your body is less and less able to fight infections like pneumonia and tuberculosis.  Follow-up care is a key part of your treatment and safety. Be sure to make and go to all appointments, and call your doctor if you are having problems. It's also a good idea to know your test results and keep a list of the medicines you take.  How can you care for yourself at home?  Take your HIV medicine exactly as directed. Talk to your doctor if you have problems such as trouble paying for your medicine or missing doses. Your doctor wants to help.  Take care to avoid food poisoning. Having HIV means you are more likely to get food-borne diseases. So learn how to handle, prepare, and store food safely.  If you smoke, try to quit. Having HIV increases your risk of heart attacks and lung cancer. Smoking increases these risks even more. If you need help quitting, talk to your doctor about stop-smoking programs and medicines. These can increase your chances of quitting for good.  Eat healthy foods. Good nutrition can help your immune system and improve your overall health. Talk to your doctor or see a dietitian if you need help.  Be active. It helps relieve stress and helps you feel less tired. It  also keeps your heart, lungs, and muscles strong. And it may help your immune system work better.  Learn more about HIV. This will let you take a more active role in your care.  If you inject drugs, use new, clean syringes and needles every time. Don't share injection supplies with others.  Get the support you need.  Join a support group. This can be a good place to share information, problem-solving tips, and emotions.  If you need more support, ask your doctor to connect you with a counselor. Counseling can help you cope with stress and stigma, and it can help if you have substance use disorder or other mental health conditions.  How can you help prevent the spread of HIV?  If you have HIV, you can take steps to avoid spreading the infection to others.  Take antiretroviral medicines.   Getting treated for HIV can help you stay healthy. It also helps protect other people from getting infected.  Let your sex and injection partners know that you have HIV.   Encourage any partners to get medicine to prevent HIV. This is called PrEP (pre-exposure prophylaxis). PrEP can help keep them from getting HIV.  Have safer sex.   Using a condom can help prevent the spread of HIV. So can having one sex partner and choosing activities that have a lower risk than vaginal or anal sex.  Never share needles, syringes, or other injection supplies.   Use new, clean supplies every time.  Talk to any partners about PEP (post-exposure prophylaxis).   This medicine can help prevent HIV if it's taken within 3 days of exposure to HIV.  Do not donate blood, plasma, sperm, body organs, or body tissues.   HIV can spread through these things.  When should you call for help?   Call 911 anytime you think you may need emergency care. For example, call if:    You passed out (lost consciousness).     You have severe shortness of breath.     You have chest pain.     You have symptoms of a stroke. These may include:  Sudden numbness, tingling, weakness,  "or loss of movement in your face, arm, or leg, especially on only one side of your body.  Sudden vision changes.  Sudden trouble speaking.  Sudden confusion or trouble understanding simple statements.  Sudden problems with walking or balance.  A sudden, severe headache that is different from past headaches.   Call your doctor now or seek immediate medical care if:    You have signs of a new or worse problem from HIV, such as:  A fever.  Coughing.  Diarrhea.  Skin changes.  Bleeding.  Confusion or not thinking clearly.   Watch closely for changes in your health, and be sure to contact your doctor if:    You have missed doses of your HIV medicine. Your doctor can offer ideas or help you find the support you need to stick with your treatment.     You are having side effects from your medicines.     You have new or worse symptoms.   Where can you learn more?  Go to https://www.[employer].net/patiented  Enter R664 in the search box to learn more about \"HIV: Care Instructions.\"  Current as of: June 12, 2023               Content Version: 14.0    2201-8761 [employer].   Care instructions adapted under license by your healthcare professional. If you have questions about a medical condition or this instruction, always ask your healthcare professional. [employer] disclaims any warranty or liability for your use of this information.      HIV Testing: Care Instructions  Overview  You can get tested for the human immunodeficiency virus (HIV). Most doctors use a blood test to check for HIV antibodies and antigens in your blood. It may also check for the genetic material (RNA) of HIV. Some tests use saliva to check for HIV antibodies. But these aren't as accurate. For example, they may give a false result if you've just been infected.  What do the results mean?    Normal (negative)    No HIV antibodies, antigens, or RNA were found.  You may need more testing. It can make sure your test results are " "correct.    Uncertain (indeterminate)    Test results didn't clearly show if you have an HIV infection.  HIV antibodies or antigens may not have formed yet.  Some other type of antibody or antigen may have affected the results.  You will need another test to be sure.    Abnormal (positive)    HIV antibodies, antigens, or RNA were found.  If you haven't had an RNA test yet, one will be done. If it's positive, you have HIV.  If your test result is positive, your doctor will talk to you. You will discuss starting treatment.  Follow-up care is a key part of your treatment and safety. Be sure to make and go to all appointments, and call your doctor if you are having problems. It's also a good idea to know your test results and keep a list of the medicines you take.  Where can you learn more?  Go to https://www.[employer].net/patiented  Enter T792 in the search box to learn more about \"HIV Testing: Care Instructions.\"  Current as of: June 12, 2023               Content Version: 14.0    3740-0033 [employer].   Care instructions adapted under license by your healthcare professional. If you have questions about a medical condition or this instruction, always ask your healthcare professional. [employer] disclaims any warranty or liability for your use of this information.      Safer Sex: Care Instructions  Overview  Safer sex is a way to reduce your risk of getting a sexually transmitted infection (STI). It can also help prevent pregnancy.  Several products can help you practice safer sex and reduce your chance of STIs. One of the best is a condom. There are internal and external condoms. You can use a special rubber sheet (dental dam) for protection during oral sex. Disposable gloves can keep your hands from touching blood, semen, or other body fluids that can carry infections.  Remember that birth control methods such as diaphragms, IUDs, foams, and birth control pills do not stop you from " getting STIs.  Follow-up care is a key part of your treatment and safety. Be sure to make and go to all appointments, and call your doctor if you are having problems. It's also a good idea to know your test results and keep a list of the medicines you take.  How can you care for yourself at home?  Think about getting vaccinated to help prevent hepatitis A, hepatitis B, and human papillomavirus (HPV). They can be spread through sex.  Use a condom every time you have sex. Use an external condom, which goes on the penis. Or use an internal condom, which goes into the vagina or anus.  Make sure you use the right size external condom. A condom that's too small can break easily. A condom that's too big can slip off during sex.  Use a new condom each time you have sex. Be careful not to poke a hole in the condom when you open the wrapper.  Don't use an internal condom and an external condom at the same time.  Never use petroleum jelly (such as Vaseline), grease, hand lotion, baby oil, or anything with oil in it. These products can make holes in the condom.  After intercourse, hold the edge of the condom as you remove it. This will help keep semen from spilling out of the condom.  Do not have sex with anyone who has symptoms of an STI, such as sores on the genitals or mouth.  Do not drink a lot of alcohol or use drugs before sex.  Limit your sex partners. Sex with one partner who has sex only with you can reduce your risk of getting an STI.  Don't share sex toys. But if you do share them, use a condom and clean the sex toys between each use.  Talk to any partners before you have sex. Talk about what you feel comfortable with and whether you have any boundaries with sex. And find out if your partner or partners may be at risk for any STI. Keep in mind that a person may be able to spread an STI even if they do not have symptoms. You and any partners may want to get tested for STIs.  Where can you learn more?  Go to  "https://www.[employer].net/patiented  Enter B608 in the search box to learn more about \"Safer Sex: Care Instructions.\"  Current as of: November 27, 2023               Content Version: 14.0    5309-8366 "[employer]   Care instructions adapted under license by your healthcare professional. If you have questions about a medical condition or this instruction, always ask your healthcare professional. "[employer]" disclaims any warranty or liability for your use of this information.      Exposure to Sexually Transmitted Infections: Care Instructions  Overview  Sexually transmitted infections (STIs) are infections spread by sexual contact. This includes genital skin-to-skin contact and vaginal, oral, and anal sex. If you're pregnant, you can also spread them to your baby before or during the birth.  STIs are common. But they don't always cause symptoms. And if they are not treated, they can lead to health problems. Testing and treatment are important to help protect the health of you and your partner or partners.  STIs caused by bacteria can go away with treatment. STIs caused by viruses can be treated to relieve symptoms, but treatment won't make them go away.  Follow-up care is a key part of your treatment and safety. Be sure to make and go to all appointments, and call your doctor if you are having problems. It's also a good idea to know your test results and keep a list of the medicines you take.  How can you care for yourself at home?  Take medicines exactly as prescribed.  If your doctor prescribed antibiotics, take them as directed. Don't stop taking them just because you feel better. You need to take the full course of antibiotics.  Tell your sex partner or partners that they will need treatment. For certain STIs, your doctor may be able to prescribe treatment for any partners also.  Don't have sexual contact while you have symptoms of an STI or are being treated for an STI.  Don't douche. " "Douching changes the normal balance of bacteria in your vagina. It may increase the risk of spreading the infection to your uterus or other reproductive organs.  How can you prevent sexually transmitted infections (STIs)?  You can help prevent STIs if you wait to have sex with a new partner (or partners) until you've each been tested for STIs. It also helps if you use condoms and if you limit your sex partners to one person who has sex only with you.  When should you call for help?   Call 911 anytime you think you may need emergency care. For example, call if:    You have sudden, severe pain in your belly or pelvis.   Call your doctor now or seek immediate medical care if:    You have new belly or pelvic pain.     You have a fever.     You have new or increased burning or pain with urination, or you cannot urinate.     You have pain, swelling, or tenderness in the scrotum.     You are pregnant and have any symptoms of an STI.   Watch closely for changes in your health, and be sure to contact your doctor if:    You have unusual vaginal bleeding.     You have a discharge from the vagina, penis, or anus.     You have any new symptoms, such as sores, bumps, rashes, blisters, or warts in the genital or anal area.     You have itching, tingling, pain, or burning in the genital or anal area.     You think you may have been exposed to an STI.     You have a sore throat or sores in your mouth or on your tongue.   Where can you learn more?  Go to https://www.[employer].net/patiented  Enter M049 in the search box to learn more about \"Exposure to Sexually Transmitted Infections: Care Instructions.\"  Current as of: November 27, 2023               Content Version: 14.0    2487-1082 [employer].   Care instructions adapted under license by your healthcare professional. If you have questions about a medical condition or this instruction, always ask your healthcare professional. [employer] disclaims any " "warranty or liability for your use of this information.      Syphilis: Care Instructions  Overview  Syphilis is a sexually transmitted infection caused by bacteria. It's usually spread through sexual contact. But it can also spread to the fetus of a person who has syphilis during pregnancy.  The first symptom is usually a painless, raised sore on the genitals, rectal area, or mouth. This is called a chancre (say \"SHANK-er\"). Later symptoms include a rash, a fever, and swollen lymph nodes. Your hair may start to fall out. Or you may feel like you have the flu.  Sometimes these symptoms go away on their own. But this doesn't mean that the infection is gone. If you don't treat syphilis with antibiotics, the infection can spread in your body. You can also spread it to others.  Antibiotics can cure syphilis and prevent more serious problems caused by it. You and your sex partner or partners need antibiotic treatment. This is to prevent passing the infection back and forth or to others.  Follow-up care is a key part of your treatment and safety. Be sure to make and go to all appointments, and call your doctor if you are having problems. It's also a good idea to know your test results and keep a list of the medicines you take.  How can you care for yourself at home?  Get all the recommended shots. Your doctor probably gave you an antibiotic shot. If you've had syphilis for a while, you may need 2 more shots.  If your doctor prescribed antibiotic pills, take them as directed. Do not stop taking them just because you feel better. You need to take the full course of antibiotics.  Don't have sexual contact with anyone while you're being treated. Wait at least 7 days after you and your partner or partners are treated and until all sores are healed before you have sexual contact. Even if you use a condom, you and your partner or partners can still spread the infection.  Wash your hands if you touch an infected area. This helps " "prevent spreading the infection to other parts of your body or to other people.  Tell your sex partner or partners that you have syphilis. They'll need treatment even if they don't have symptoms.  Go to all follow-up tests. This helps your doctor check that treatment worked. Your doctor will tell you when to have testing done.  How can you prevent it?  Here are some ways to help prevent STIs.  Limit your sex partners. Sex with one partner who has sex only with you can reduce your risk of getting an STI.  Talk with your partner or partners about STIs before you have sex. Find out if they are at risk for an STI. Remember that it's possible to have an STI and not know it.  Wait to have sex with new partners until you've each been tested.  Don't have sex if you have symptoms of an infection or if you are being treated for an STI.  Use a condom every time you have sex. Condoms are the only form of birth control that also helps prevent STIs.  If you had sex without a condom, ask your doctor if taking a preventive medicine is recommended. It may help prevent certain STIs if it's taken within 24 to 72 hours after unprotected sex.  Don't share sex toys. But if you do share them, use a condom and clean the sex toys between each use.  Vaccines are available for some STIs, such as HPV. Ask your doctor for more information.  When should you call for help?  Watch closely for changes in your health, and be sure to contact your doctor if:    You do not get better as expected.     Your symptoms continue or come back after treatment.     You develop new symptoms, such as a fever.   Where can you learn more?  Go to https://www.[employer].net/patiented  Enter Z000 in the search box to learn more about \"Syphilis: Care Instructions.\"  Current as of: November 27, 2023               Content Version: 14.0    3552-9311 [employer].   Care instructions adapted under license by your healthcare professional. If you have questions " about a medical condition or this instruction, always ask your healthcare professional. [employer] disclaims any warranty or liability for your use of this information.

## 2024-04-26 NOTE — PROGRESS NOTES
Preventive Care Visit  [facility] YOU Schwartz DO, Family Medicine  Apr 26, 2024      Assessment & Plan     Hypothyroidism due to Hashimoto's thyroiditis  TSH 6 weeks ago over suppressed at 0.2. Synthroid decreased from 150 mcg to 137 mcg. TSH in office today is 0.39, no new sx. Plan to keep Synthroid at current dose and recheck in a year unless new sx arise.   - levothyroxine (SYNTHROID/LEVOTHROID) 137 MCG tablet; Take 1 tablet (137 mcg) by mouth daily  - TSH; Future  - TSH    Encounter for screening involving social determinants of health (SDoH)  This patient's access to healthcare is limited by SDoH due to mental health, financial/food insecurity causing a lot of stress.     Routine general medical examination at a health care facility  Screening for HIV (human immunodeficiency virus)  Need for hepatitis C screening test  Visit for screening mammogram  Counseling  Appropriate preventive services were discussed with this patient, including applicable screening as appropriate for fall prevention, nutrition, physical activity, Tobacco-use cessation, weight loss and cognition.  Checklist reviewing preventive services available has been given to the patient.  Reviewed patient's diet, addressing concerns and/or questions.   She is at risk for lack of exercise and has been provided with information to increase physical activity for the benefit of her well-being.   The patient was instructed to see the dentist every 6 months.   Patient declined vaccines - she was counseled on the risks and benefits.   - Dental Referral; Future  - UA Macroscopic with reflex to Microscopic and Culture; Future  - UA Macroscopic with reflex to Microscopic and Culture  - Urine Microscopic Exam  - Urine Culture  - MA SCREENING DIGITAL BILAT - Future  (s+30); Future  - Hepatitis C Screen Reflex to HCV RNA Quant and Genotype; Future    Screening examination for venereal disease  Patient has multiple partners who  have a penis and a vagina. Uses mouth and genitals for sex. Previously has used anus for sex - will plan to screen for local GC infections at this visit.     - Vaginal-Chlamydia trachomatis/Neisseria gonorrhoeae by PCR  - HIV Antigen Antibody Combo Cascade; Future  - Treponema Abs w Reflex to RPR and Titer; Future  - Vagina-Wet preparation  - Throat/pharynx - Chlamydia trachomatis/Neisseria gonorrhoeae by PCR; Future  - Throat/pharynx - Chlamydia trachomatis/Neisseria gonorrhoeae by PCR  - Rectum - Chlamydia trachomatis/Neisseria gonorrhoeae by PCR  - HIV Antigen Antibody Combo Cascade  - Treponema Abs w Reflex to RPR and Titer          Return in about 53 weeks (around 5/2/2025) for Annual Wellness Visit.    Melanie Catalan is a 38 year old, presenting for the following:  Physical (Pt wants thyroid med refill)        4/26/2024     1:49 PM   Additional Questions   Roomed by Doua   Accompanied by Self         4/26/2024     1:49 PM   Patient Reported Additional Medications   Patient reports taking the following new medications n/a         4/26/2024    Information    services provided? No        Health Care Directive  Patient does not have a Health Care Directive or Living Will: Discussed advance care planning with patient; information given to patient to review.    General Wellbeing   - Laid off recently   - Food insecurity   - Stressors include: 4 kids, polyamorous relationships  - Group therapy for eating disorder, Therapy x1 weeks   - Has anorgasmia with Zoloft.     Vaginal Irritation  - Chronic   - Using Hibiclens/OTC candidal infections   - Itchy vulva, no discharge    Dental   [facility]    Hearing   - trying to see an ENT   - R inner ear - muffled/stuffed up.   - hx of dizziness, tinnitus - loratadine helps    Vision  - sees an optometrist               4/26/2024   General Health   How would you rate your overall physical health? Good   Feel stress (tense, anxious, or  unable to sleep) Rather much   (!) STRESS CONCERN      4/26/2024   Nutrition   Three or more servings of calcium each day? Yes   Diet: Regular (no restrictions)   How many servings of fruit and vegetables per day? (!) 2-3   How many sweetened beverages each day? (!) 2         4/26/2024   Exercise   Days per week of moderate/strenous exercise 3 days   Average minutes spent exercising at this level 30 min         4/26/2024   Social Factors   Frequency of gathering with friends or relatives More than three times a week   Worry food won't last until get money to buy more Yes   Food not last or not have enough money for food? Yes   Do you have housing?  Yes   Are you worried about losing your housing? No   Lack of transportation? No   Unable to get utilities (heat,electricity)? No   (!) FOOD SECURITY CONCERN PRESENT      4/26/2024   Dental   Dentist two times every year? (!) NO         4/26/2024   TB Screening   Were you born outside of the [place]? No           Today's PHQ-2 Score:       2/27/2024    10:01 AM   PHQ-2 ( 1999 [employer])   Q1: Little interest or pleasure in doing things 0   Q2: Feeling down, depressed or hopeless 1   PHQ-2 Score 1   Q1: Little interest or pleasure in doing things Not at all   Q2: Feeling down, depressed or hopeless Several days   PHQ-2 Score 1         4/26/2024   Substance Use   Alcohol more than 3/day or more than 7/wk No   Do you use any other substances recreationally? (!) CANNABIS PRODUCTS     Social History     Tobacco Use    Smoking status: Never   Substance Use Topics    Alcohol use: Yes    Drug use: No          Mammogram Screening - Mammogram every 1-2 years updated in Health Maintenance based on mutual decision making          4/26/2024   One time HIV Screening   Previous HIV test? Yes         4/26/2024   STI Screening   New sexual partner(s) since last STI/HIV test? (!) YES - has sex with partners with penis and vagina. Uses genitalia, and mouth for sex. Has not had anal penetrative sex  "in a long time.      History of abnormal Pap smear: One abnormal pap smear >20 years ago. Tested negative since. Updated on PAP every 5 years with negative HPV co-testing recommended        8/30/2022     1:25 PM   PAP / HPV   PAP-ABSTRACT See Scanned Document           This result is from an external source.     ASCVD Risk   The 10-year ASCVD risk score (Ki NAIDU, et al., 2019) is: 0.4%    Values used to calculate the score:      Age: 38 years      Sex: Female      Is Non- : No      Diabetic: No      Tobacco smoker: No      Systolic Blood Pressure: 116 mmHg      Is BP treated: No      HDL Cholesterol: 48 mg/dL      Total Cholesterol: 160 mg/dL        4/26/2024   Contraception/Family Planning   Questions about contraception or family planning No        Reviewed and updated as needed this visit by Provider                    Labs reviewed in EPIC      Review of Systems  Constitutional, HEENT, cardiovascular, pulmonary, GI, , musculoskeletal, neuro, skin, endocrine and psych systems are negative, except as otherwise noted.     Objective    Exam  /79 (BP Location: Left arm, Patient Position: Sitting, Cuff Size: Adult Large)   Pulse 62   Temp 98.1  F (36.7  C) (Oral)   Resp 16   Ht 1.676 m (5' 6\")   Wt 124.7 kg (275 lb)   LMP 03/18/2024 (Exact Date)   SpO2 97%   BMI 44.39 kg/m     Estimated body mass index is 44.39 kg/m  as calculated from the following:    Height as of this encounter: 1.676 m (5' 6\").    Weight as of this encounter: 124.7 kg (275 lb).    Physical Exam  Vitals reviewed.   Constitutional:       General: She is not in acute distress.     Appearance: Normal appearance. She is not ill-appearing.   HENT:      Head: Normocephalic and atraumatic.   Eyes:      Conjunctiva/sclera: Conjunctivae normal.   Cardiovascular:      Rate and Rhythm: Normal rate.   Pulmonary:      Effort: Pulmonary effort is normal.   Neurological:      General: No focal deficit present.    "   Mental Status: She is alert.   Psychiatric:         Mood and Affect: Mood normal.         Behavior: Behavior normal.         Thought Content: Thought content normal.         Judgment: Judgment normal.               Signed Electronically by: Paula Schwartz DO

## 2024-04-26 NOTE — PROGRESS NOTES
Preceptor Attestation:    I discussed the patient with the resident and evaluated the patient in person. I have verified the content of the note, which accurately reflects my assessment of the patient and the plan of care.   Supervising Physician:  Eduar Mahoney MD, MD.

## 2024-04-27 LAB
C TRACH DNA SPEC QL PROBE+SIG AMP: NEGATIVE
HCV AB SERPL QL IA: NONREACTIVE
HIV 1+2 AB+HIV1 P24 AG SERPL QL IA: NONREACTIVE
N GONORRHOEA DNA SPEC QL NAA+PROBE: NEGATIVE
T PALLIDUM AB SER QL: NONREACTIVE
TSH SERPL DL<=0.005 MIU/L-ACNC: 0.39 UIU/ML (ref 0.3–4.2)

## 2024-04-28 ENCOUNTER — HEALTH MAINTENANCE LETTER (OUTPATIENT)
Age: 39
End: 2024-04-28

## 2024-04-28 LAB — BACTERIA UR CULT: NO GROWTH

## 2024-04-29 RX ORDER — LEVOTHYROXINE SODIUM 137 UG/1
137 TABLET ORAL DAILY
Qty: 90 TABLET | Refills: 3 | Status: SHIPPED | OUTPATIENT
Start: 2024-04-29

## 2024-05-29 ENCOUNTER — MYC MEDICAL ADVICE (OUTPATIENT)
Dept: FAMILY MEDICINE | Facility: CLINIC | Age: 39
End: 2024-05-29
Payer: COMMERCIAL

## 2024-05-30 NOTE — TELEPHONE ENCOUNTER
5/30/24    CC attempted to contact patient to schedule a follow up with PCP or purple team provider at [facility].  CC left patient a voicemail and provided direct call back number to schedule an appointment.    Cherry Ambrocio  Care Coordinator- [facility]  [phone]

## 2024-05-31 ENCOUNTER — MYC MEDICAL ADVICE (OUTPATIENT)
Dept: FAMILY MEDICINE | Facility: CLINIC | Age: 39
End: 2024-05-31

## 2024-05-31 ENCOUNTER — VIRTUAL VISIT (OUTPATIENT)
Dept: FAMILY MEDICINE | Facility: CLINIC | Age: 39
End: 2024-05-31
Payer: COMMERCIAL

## 2024-05-31 DIAGNOSIS — N93.9 ABNORMAL UTERINE BLEEDING: Primary | ICD-10-CM

## 2024-05-31 PROCEDURE — 99214 OFFICE O/P EST MOD 30 MIN: CPT | Mod: 95 | Performed by: FAMILY MEDICINE

## 2024-05-31 RX ORDER — ONDANSETRON 4 MG/1
4 TABLET, ORALLY DISINTEGRATING ORAL EVERY 8 HOURS PRN
Qty: 10 TABLET | Refills: 0 | Status: SHIPPED | OUTPATIENT
Start: 2024-05-31

## 2024-05-31 RX ORDER — NORETHINDRONE ACETATE AND ETHINYL ESTRADIOL 1MG-20(21)
1 KIT ORAL DAILY
Qty: 90 TABLET | Refills: 1 | Status: SHIPPED | OUTPATIENT
Start: 2024-05-31 | End: 2024-07-19

## 2024-05-31 RX ORDER — NORGESTIMATE AND ETHINYL ESTRADIOL 0.25-0.035
KIT ORAL
Qty: 30 TABLET | Refills: 0 | Status: SHIPPED | OUTPATIENT
Start: 2024-05-31 | End: 2024-06-10 | Stop reason: ALTCHOICE

## 2024-07-19 ENCOUNTER — OFFICE VISIT (OUTPATIENT)
Dept: FAMILY MEDICINE | Facility: CLINIC | Age: 39
End: 2024-07-19
Payer: COMMERCIAL

## 2024-07-19 VITALS
DIASTOLIC BLOOD PRESSURE: 80 MMHG | SYSTOLIC BLOOD PRESSURE: 118 MMHG | HEART RATE: 61 BPM | HEIGHT: 66 IN | OXYGEN SATURATION: 100 % | BODY MASS INDEX: 44.39 KG/M2 | RESPIRATION RATE: 17 BRPM

## 2024-07-19 DIAGNOSIS — Z30.41 ENCOUNTER FOR SURVEILLANCE OF CONTRACEPTIVE PILLS: ICD-10-CM

## 2024-07-19 DIAGNOSIS — G43.109 MIGRAINE WITH AURA AND WITHOUT STATUS MIGRAINOSUS, NOT INTRACTABLE: ICD-10-CM

## 2024-07-19 DIAGNOSIS — I83.811 VARICOSE VEINS OF RIGHT LOWER EXTREMITY WITH PAIN: Primary | ICD-10-CM

## 2024-07-19 DIAGNOSIS — F32.A DEPRESSION, UNSPECIFIED DEPRESSION TYPE: ICD-10-CM

## 2024-07-19 DIAGNOSIS — F41.1 GAD (GENERALIZED ANXIETY DISORDER): ICD-10-CM

## 2024-07-19 DIAGNOSIS — E03.9 HYPOTHYROIDISM, UNSPECIFIED TYPE: ICD-10-CM

## 2024-07-19 PROBLEM — F33.1 MODERATE EPISODE OF RECURRENT MAJOR DEPRESSIVE DISORDER (H): Status: ACTIVE | Noted: 2024-07-19

## 2024-07-19 PROBLEM — R73.03 PREDIABETES: Status: ACTIVE | Noted: 2022-02-03

## 2024-07-19 PROCEDURE — 84443 ASSAY THYROID STIM HORMONE: CPT

## 2024-07-19 PROCEDURE — 36415 COLL VENOUS BLD VENIPUNCTURE: CPT

## 2024-07-19 PROCEDURE — 99214 OFFICE O/P EST MOD 30 MIN: CPT | Mod: GC

## 2024-07-19 RX ORDER — INDOMETHACIN 25 MG/1
25 CAPSULE ORAL PRN
Qty: 30 CAPSULE | Refills: 3 | Status: SHIPPED | OUTPATIENT
Start: 2024-07-19

## 2024-07-19 RX ORDER — RIZATRIPTAN BENZOATE 10 MG/1
10 TABLET ORAL
Qty: 30 TABLET | Refills: 3 | Status: SHIPPED | OUTPATIENT
Start: 2024-07-19

## 2024-07-19 RX ORDER — NORETHINDRONE ACETATE AND ETHINYL ESTRADIOL 1MG-20(21)
1 KIT ORAL DAILY
Qty: 90 TABLET | Refills: 1 | Status: SHIPPED | OUTPATIENT
Start: 2024-07-19

## 2024-07-19 RX ORDER — DULOXETIN HYDROCHLORIDE 30 MG/1
30 CAPSULE, DELAYED RELEASE ORAL 2 TIMES DAILY
Qty: 120 CAPSULE | Refills: 0 | Status: SHIPPED | OUTPATIENT
Start: 2024-07-19 | End: 2024-09-17

## 2024-07-19 ASSESSMENT — PATIENT HEALTH QUESTIONNAIRE - PHQ9
5. POOR APPETITE OR OVEREATING: SEVERAL DAYS
SUM OF ALL RESPONSES TO PHQ QUESTIONS 1-9: 15

## 2024-07-19 ASSESSMENT — ANXIETY QUESTIONNAIRES
6. BECOMING EASILY ANNOYED OR IRRITABLE: NEARLY EVERY DAY
GAD7 TOTAL SCORE: 11
2. NOT BEING ABLE TO STOP OR CONTROL WORRYING: MORE THAN HALF THE DAYS
3. WORRYING TOO MUCH ABOUT DIFFERENT THINGS: MORE THAN HALF THE DAYS
1. FEELING NERVOUS, ANXIOUS, OR ON EDGE: MORE THAN HALF THE DAYS
7. FEELING AFRAID AS IF SOMETHING AWFUL MIGHT HAPPEN: SEVERAL DAYS
5. BEING SO RESTLESS THAT IT IS HARD TO SIT STILL: NOT AT ALL
GAD7 TOTAL SCORE: 11
IF YOU CHECKED OFF ANY PROBLEMS ON THIS QUESTIONNAIRE, HOW DIFFICULT HAVE THESE PROBLEMS MADE IT FOR YOU TO DO YOUR WORK, TAKE CARE OF THINGS AT HOME, OR GET ALONG WITH OTHER PEOPLE: VERY DIFFICULT

## 2024-07-19 NOTE — PROGRESS NOTES
Assessment & Plan     Varicose veins of right lower extremity with pain  Right lateral leg with progressively worsening bulging vein with associated pain most likely varicose vein. No history of DVT. Not concerned for DVT.   - US Lower Extremity Venous Duplex Right  - Vascular Surgery Referral  -Recommended heat application, compression stockings, exercise, and leg elevation for symptomatic improvement    Encounter for surveillance of contraceptive pills  Was recently seen by OB/GYN 6/2024, has discussed risk and benefit of combined hormone contraceptive in setting of migraine with aura history.   - norethindrone-ethinyl estradiol (JUNEL FE 1/20) 1-20 MG-MCG tablet  Dispense: 90 tablet; Refill: 1    Depression, unspecified depression type, moderately severe  Generalized anxiety disorder, moderate  Increased stress recently leading to worsening symptoms over past few weeks. Has outpatient individual and group therapy. Has tried duloxetine in the past with improvement in symptoms, but did note sexual side effects. Can consider adding Wellbutrin to duloxetine for improvement of sexual side effects if that is an issue in the future.  - DULoxetine (CYMBALTA) 30 MG capsule  Dispense: 120 capsule; Refill: 0    Migraine with aura and without status migrainosus, not intractable  No recent migraines. Has had cluster-type headaches with increased stress and mood changes recently. Has medications below for as needed for migraines with aura.  - rizatriptan (MAXALT) 10 MG tablet  Dispense: 30 tablet; Refill: 3  - indomethacin (INDOCIN) 25 MG capsule  Dispense: 30 capsule; Refill: 3    Hypothyroidism, unspecified type  Dose decreased from 150 to 137mcg two months ago. Given change in mood and recent change in dose, will check TSH to assess for hypothyroidism as etiology for mood changes.  - TSH    Follow Up Actions Taken  Crisis resource information provided in After Visit Summary  Referred patient back to current mental health  "provider.   Provided suicide crisis line information    Return in about 6 weeks (around 8/30/2024) for Follow up, with any available provider.    Subjective   Alayna is a 38 year old, presenting for the following health issues:  Other (Pt presents with varicose veins, pt reports pain, hotness and swelling. Pt would also like to discuss restarting antidepressant medications )    HPI   #Right leg vein bulging, pain  -RLE vein is bulging and bothering her  -lately went back to work, sitting more  -has had enlarged vein  -hot, heavy, itchy  -getting longer and higher  -no leg swelling or redness    RLE 8/2017:  No evidence of deep vein thrombosis within the right lower extremity.   Please note there is thrombosis within a superficial vein involving the anterolateral right thigh extending to the lateral right knee. This would indicate superficial thrombophlebitis.     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 the past few weeks  -low mood  -feels bad about herself, like a failure to herself and family  -overwhelmed  -worse concentration  -no slow or fast movement or speaking  -no appetite change  -no sleep change  -normal energy  -no SI or SH, feels safe at home  -no symptoms of anahi  -intermittent headaches over the past few days  -muscle tension  -racing thoughts, uncontrolled worrying    -has tried citalopram, sertraline, and escitalopram in the past with notable sexual side effects without much improvement in mental health symptoms  -duloxetine helped when took 2 years ago when in [facility], however, stopped due to anorgasmia while on the medicine  -regular group therapy and weekly personal therapy  -guided medication has helped      Review of Systems  Constitutional, HEENT, cardiovascular, pulmonary, " "gi and gu systems are negative, except as otherwise noted.      Objective    /80   Pulse 61   Resp 17   Ht 1.676 m (5' 6\")   LMP 05/09/2024   SpO2 100%   BMI 44.39 kg/m    Visit Vitals  /80   Pulse 61   Resp 17     Physical Exam   GENERAL: alert and no distress  RESP: speaking in full sentences, no increased work of breathing  CV: warm and well-perfused  MS: no gross musculoskeletal defects noted, no edema, non-tender to palpation  Skin: right lateral thigh with bulging compressible serpiginous mobile mass with deep blue/purple color that extends to right lateral lower leg            7/19/2024     2:28 PM   PHQ   PHQ-9 Total Score 15   Q9: Thoughts of better off dead/self-harm past 2 weeks Not at all         7/19/2024     2:28 PM   JANEY-7 SCORE   Total Score 11     Signed Electronically by: Miri Coombs MD  "

## 2024-07-19 NOTE — PROGRESS NOTES
7/19/2024     2:28 PM   JANEY-7 SCORE   Total Score 11            7/19/2024     2:28 PM   PHQ   PHQ-9 Total Score 15   Q9: Thoughts of better off dead/self-harm past 2 weeks Not at all

## 2024-07-19 NOTE — PATIENT INSTRUCTIONS
Patient Education       Varicose vein  -schedule right leg ultrasound  -compression stockings  -heat packs  -elevating legs  -regular leg exercise    Depression and anxiety  -start duloxetine 30mg once daily for one week, then 30mg twice daily after that    -We are checking TSH thyroid function lab today    -Return in 6-8 weeks

## 2024-07-20 LAB — TSH SERPL DL<=0.005 MIU/L-ACNC: 1.23 UIU/ML (ref 0.3–4.2)

## 2024-07-27 ENCOUNTER — MYC REFILL (OUTPATIENT)
Dept: FAMILY MEDICINE | Facility: CLINIC | Age: 39
End: 2024-07-27
Payer: COMMERCIAL

## 2024-07-27 DIAGNOSIS — E06.3 HYPOTHYROIDISM DUE TO HASHIMOTO'S THYROIDITIS: ICD-10-CM

## 2024-07-29 RX ORDER — LEVOTHYROXINE SODIUM 137 UG/1
137 TABLET ORAL DAILY
Qty: 90 TABLET | Refills: 3 | OUTPATIENT
Start: 2024-07-29

## 2024-09-17 DIAGNOSIS — F32.A DEPRESSION, UNSPECIFIED DEPRESSION TYPE: ICD-10-CM

## 2024-09-18 RX ORDER — DULOXETIN HYDROCHLORIDE 30 MG/1
30 CAPSULE, DELAYED RELEASE ORAL 2 TIMES DAILY
Qty: 360 CAPSULE | Refills: 0 | Status: SHIPPED | OUTPATIENT
Start: 2024-09-18

## 2024-09-19 ENCOUNTER — OFFICE VISIT (OUTPATIENT)
Dept: VASCULAR SURGERY | Facility: CLINIC | Age: 39
End: 2024-09-19
Attending: FAMILY MEDICINE
Payer: COMMERCIAL

## 2024-09-19 DIAGNOSIS — I83.811 VARICOSE VEINS OF RIGHT LOWER EXTREMITY WITH PAIN: ICD-10-CM

## 2024-09-19 PROCEDURE — G2211 COMPLEX E/M VISIT ADD ON: HCPCS | Performed by: SURGERY

## 2024-09-19 PROCEDURE — 99203 OFFICE O/P NEW LOW 30 MIN: CPT | Performed by: SURGERY

## 2024-09-19 NOTE — LETTER
9/19/2024      Alayna Mccauley  [address] [facility] [address]      Dear Colleague,    Thank you for referring your patient, Alayna Mccauley, to the [facility] [place]. Please see a copy of my visit note below.    It was a pleasure to see Alayna Mccauley in the vein clinic today.  She is a a 38-year-old mother of 4 who reports progressively worsening pain in the right lower extremity in the area of varicose veins in the right thigh and lateral calf.  He tells me this has been going on for better part of a year.  She experiences burning, throbbing, heaviness and pruritus.  Symptoms are not present in the morning but get worse during the day.    She works as a  in a co-op.    Her mother also had varicose vein surgery in both lower extremities.    On examination she has a cluster of large ropey varicosities going from the lateral right calf and over the lateral aspect of the knee and then into the thigh and groin.  I do not see any changes of chronic venous insufficiency.  There are no changes consistent with phlebitis.    Diagnosis: Lifestyle limiting right lower extremity varicose veins with pain.    Plan: Explained the pathophysiology of disease to her in detail.  My recommendation would be for her to start wearing compression stocking and increase her exercise level.  We will see her back in 3 months to reevaluate her symptoms after she has done a trial of external compression.  We will also get sonography of the right lower extremity venous system to evaluate for incompetence.    All questions answered.      Again, thank you for allowing me to participate in the care of your patient.        Sincerely,        Sunny Santos MD

## 2024-09-19 NOTE — PROGRESS NOTES
It was a pleasure to see Alayna Mccauley in the vein clinic today.  She is a a 38-year-old mother of 4 who reports progressively worsening pain in the right lower extremity in the area of varicose veins in the right thigh and lateral calf.  He tells me this has been going on for better part of a year.  She experiences burning, throbbing, heaviness and pruritus.  Symptoms are not present in the morning but get worse during the day.    She works as a  in a co-op.    Her mother also had varicose vein surgery in both lower extremities.    On examination she has a cluster of large ropey varicosities going from the lateral right calf and over the lateral aspect of the knee and then into the thigh and groin.  I do not see any changes of chronic venous insufficiency.  There are no changes consistent with phlebitis.    Diagnosis: Lifestyle limiting right lower extremity varicose veins with pain.    Plan: Explained the pathophysiology of disease to her in detail.  My recommendation would be for her to start wearing compression stocking and increase her exercise level.  We will see her back in 3 months to reevaluate her symptoms after she has done a trial of external compression.  We will also get sonography of the right lower extremity venous system to evaluate for incompetence.    All questions answered.

## 2024-10-10 ENCOUNTER — IMMUNIZATION (OUTPATIENT)
Dept: FAMILY MEDICINE | Facility: CLINIC | Age: 39
End: 2024-10-10
Payer: COMMERCIAL

## 2024-10-10 PROCEDURE — 90471 IMMUNIZATION ADMIN: CPT

## 2024-10-10 PROCEDURE — 99207 PR NO CHARGE NURSE ONLY: CPT

## 2024-10-10 PROCEDURE — 90480 ADMN SARSCOV2 VAC 1/ONLY CMP: CPT

## 2024-10-10 PROCEDURE — 90656 IIV3 VACC NO PRSV 0.5 ML IM: CPT

## 2024-10-10 PROCEDURE — 91320 SARSCV2 VAC 30MCG TRS-SUC IM: CPT

## 2024-11-12 ENCOUNTER — OFFICE VISIT (OUTPATIENT)
Dept: FAMILY MEDICINE | Facility: CLINIC | Age: 39
End: 2024-11-12
Payer: COMMERCIAL

## 2024-11-12 VITALS
BODY MASS INDEX: 43.75 KG/M2 | HEART RATE: 66 BPM | TEMPERATURE: 97.7 F | RESPIRATION RATE: 16 BRPM | WEIGHT: 272.2 LBS | DIASTOLIC BLOOD PRESSURE: 85 MMHG | HEIGHT: 66 IN | SYSTOLIC BLOOD PRESSURE: 129 MMHG | OXYGEN SATURATION: 99 %

## 2024-11-12 DIAGNOSIS — N93.9 ABNORMAL UTERINE BLEEDING: Primary | ICD-10-CM

## 2024-11-12 DIAGNOSIS — G43.109 MIGRAINE WITH AURA AND WITHOUT STATUS MIGRAINOSUS, NOT INTRACTABLE: ICD-10-CM

## 2024-11-12 DIAGNOSIS — N39.3 FEMALE STRESS INCONTINENCE: ICD-10-CM

## 2024-11-12 RX ORDER — MAGNESIUM OXIDE 400 MG/1
400 TABLET ORAL DAILY PRN
Qty: 30 TABLET | Refills: 0 | Status: SHIPPED | OUTPATIENT
Start: 2024-11-12

## 2024-11-12 ASSESSMENT — PATIENT HEALTH QUESTIONNAIRE - PHQ9
SUM OF ALL RESPONSES TO PHQ QUESTIONS 1-9: 7
10. IF YOU CHECKED OFF ANY PROBLEMS, HOW DIFFICULT HAVE THESE PROBLEMS MADE IT FOR YOU TO DO YOUR WORK, TAKE CARE OF THINGS AT HOME, OR GET ALONG WITH OTHER PEOPLE: SOMEWHAT DIFFICULT
SUM OF ALL RESPONSES TO PHQ QUESTIONS 1-9: 7

## 2024-11-12 NOTE — PROGRESS NOTES
"    Assessment & Plan     Abnormal uterine bleeding  Female Stress Incontinence   Patient presents with a history of postcoital bleeding one or two times a month for at least the last two years. Penetrative sex sometimes causes bleeding which then patient states \"causes a period\" that lasts for up to a week at a time. Difficult to quantify amount of bleeding. Patient's main concern is frequency and amount of bleeding that is impacting her quality of life. She is currently on norethindrone ethinyl estradiol pills for contraception and has been taking the pills daily for three months at a time and then stopping for 3-4 days when she has bleeding. She has a history of breakthrough bleeding on mirena IUD, progestin only pills. Patient also reports pelvic floor dysfunction after the birth of her 4 children with associated stress incontinence. The combination of irregular bleeding and incontinence is causing a rash and itchiness in perineal area. Last pap in 2022 was normal and HPV testing was negative. Last pelvic US was in 2023 with normal uterus and ovaries visualized. No new partners since last STI screening. No UTI symptoms. Due to history of abnormal uterine bleeding since menerche onset coupled with current concerns of AUB, will obtain a pelvic US to eval for structural concerns (fibroid/adenomyosis) and endometrial hyperplasia.There concern for possible cervical polyp due to bleeding after sex, however this occurred twice which then caused period like bleeding for 1 week.  Will perform pelvic exam at next visit as we might choose to do endometrial sampling. Referral for pelvic floor PT placed. Follow up after pelvic US complete.   - US Pelvic Complete w Transvaginal; Future  - Physical Therapy  Referral; Future    Migraine with aura and without status migrainosus, not intractable  Patient has a history of migraine with aura. She has been having week long headaches for the last 2 months. Currently takes " rizatriptan for abortive therapy and alleve as needed. Follows with neurologist in [place]. Reports taking rizatriptan daily; concern for possible medication induced headaches due to overuse. Counseled on taking a maximum of 10 pills a month and using ibuprofen/alleve/tylenol for symptom relief. Prescribed magnesium oxide to trial and see if it improves headaches. Follow up with neurologist. Provided return precautions.  - magnesium oxide (MAG-OX) 400 MG tablet; Take 1 tablet (400 mg) by mouth daily as needed (headache).    The longitudinal plan of care for the diagnosis(es)/condition(s) as documented were addressed during this visit. Due to the added complexity in care, I will continue to support Alayna in the subsequent management and with ongoing continuity of care.    FUTURE APPOINTMENTS:       - Follow-up visit after pelvic US    Return in about 4 weeks (around 12/10/2024).    Subjective   Alayna is a 39 year old, presenting for the following health issues:  Abnormal Bleeding Problem (Period 2 times this month) and Headache        11/12/2024     9:23 AM   Additional Questions   Roomed by Doua   Accompanied by Self         11/12/2024     9:23 AM   Patient Reported Additional Medications   Patient reports taking the following new medications n/a         11/12/2024    Information    services provided? No      HPI   Vaginal bleeding:   -Bleeds after penetrative sex. Not every time, maybe twice a month. Sex can trigger the bleeding, which then turns into a full blown period for at least a week. She has been dealing with this for at least two years.  -Notices around full and new moon   -Dealing with pelvic floor issues, incontinence   -Does not endorse painful bleeding   -Cannot quantify the quantity, no bigger than a size of a quarter.   -Menses - could not provide great history, menarche age 6   -Tossing around idea of hysterectomy to solve both issues.   - Itchy mons pubis, been using  "antifungal and diaper rash. Thinks its due to incontinence and bleeding   - On combo pill for contraception. Was taking every day and was only having a period every 3 months.   - Bleeds a week at a time when period comes   - Polyamorous in the past but has been monogamous for the last year  - Some pain with vaginal penetration   - No abnormal discharge   - Mirena IUD for 7 years - removed couple years ago, :\"mirena was getting pushed\"    Headaches:   -History of migraine with aura, follows with neurology in [place], recently missed appointment.  - Uses Rizatriptan and alleve for symptoms relief. Reports taking triptan medication daily when the headaches occur.   -Stress and crying usually triggers headaches     Review of Systems  Constitutional, HEENT, cardiovascular, pulmonary, gi and gu systems are negative, except as otherwise noted.      Objective    /85   Pulse 66   Temp 97.7  F (36.5  C) (Oral)   Resp 16   Ht 1.676 m (5' 6\")   Wt 123.5 kg (272 lb 3.2 oz)   LMP 11/04/2024 (Approximate)   SpO2 99%   BMI 43.93 kg/m    Body mass index is 43.93 kg/m .  Physical Exam   GENERAL: alert and no distress  NECK: no adenopathy, no asymmetry, masses, or scars  RESP: lungs clear to auscultation - no rales, rhonchi or wheezes  CV: regular rate and rhythm, normal S1 S2, no S3 or S4, no murmur, click or rub, no peripheral edema  ABDOMEN: soft, nontender, no hepatosplenomegaly, no masses and bowel sounds normal  MS: no gross musculoskeletal defects noted, no edema        Answers submitted by the patient for this visit:  Patient Health Questionnaire (Submitted on 11/12/2024)  If you checked off any problems, how difficult have these problems made it for you to do your work, take care of things at home, or get along with other people?: Somewhat difficult  PHQ9 TOTAL SCORE: 7    Katelynn Dunn, MS3  [employer]    I was present with the medical student who participated in the service and in the " documentation of this note. I have verified the history and personally performed the physical exam and medical decision making, and have verified the content of the note, which accurately reflects my assessment of the patient and the plan of care.   Paula Schwartz DO        Signed Electronically by: Paula Schwartz DO  {Email feedback regarding this note to [email or web address]   :[id number]}

## 2024-11-13 NOTE — PATIENT INSTRUCTIONS
Patient Education   Here is the plan from today's visit    1. Abnormal uterine bleeding (Primary)    - US Pelvic Complete w Transvaginal; Future  - Physical Therapy  Referral; Future    2. Female stress incontinence    - US Pelvic Complete w Transvaginal; Future  - Physical Therapy  Referral; Future    3. Migraine with aura and without status migrainosus, not intractable    - magnesium oxide (MAG-OX) 400 MG tablet; Take 1 tablet (400 mg) by mouth daily as needed (headache).  Dispense: 30 tablet; Refill: 0        Please call or return to clinic if your symptoms don't go away.    Follow up plan  Return in about 4 weeks (around 12/10/2024).    Thank you for coming to [facility] today.  Lab Testing:  **If you had lab testing today and your results are reassuring or normal they will be mailed to you or sent through mPura within 7 days.   **If the lab tests need quick action we will call you with the results.  **If you are having labs done on a different day, please call [phone] to schedule at [facility] or [phone] for other [facility] locations. Labs do not offer walk-in appointments.  The phone number we will call with results is # [phone] (home) . If this is not the best number please call our clinic and change the number.  Medication Refills:  If you need any refills please call your pharmacy and they will contact us.   If you need to  your refill at a new pharmacy, please contact the new pharmacy directly. The new pharmacy will help you get your medications transferred faster.   Scheduling:  If you have any concerns about today's visit or wish to schedule another appointment please call our office during normal business hours [phone] (8-5:00 M-F). If you can no longer make a scheduled visit, please cancel via mPura or call us to cancel.   If a referral was made to an [facility] specialty provider and you do not get a call from [facility]  scheduling, please refer to directions on your visit summary or call our office during normal business hours for assistance.   If a Mammogram was ordered for you at the Breast Center call [phone] to schedule or change your appointment.  If you had an XRay/CT/Ultrasound/MRI ordered the number is [phone] to schedule or change your radiology appointment.   [facility] has limited ultrasound appointments available on Wednesdays, if you would like your ultrasound at [facility], please call [phone] to schedule.   Medical Concerns:  If you have urgent medical concerns please call [phone] at any time of the day.    Paula Schwartz, DO

## 2024-11-15 ENCOUNTER — TELEPHONE (OUTPATIENT)
Dept: FAMILY MEDICINE | Facility: CLINIC | Age: 39
End: 2024-11-15
Payer: COMMERCIAL

## 2024-11-15 NOTE — TELEPHONE ENCOUNTER
CC attempted to contact patient to schedule US that was ordered on 11/12. CC had to leave patient a voicemail and provided clinic call back number to schedule.    Cherry Ambrocio  Care Coordinator- [facility]  [phone]

## 2025-01-17 ENCOUNTER — OFFICE VISIT (OUTPATIENT)
Dept: FAMILY MEDICINE | Facility: CLINIC | Age: 40
End: 2025-01-17
Payer: COMMERCIAL

## 2025-01-17 VITALS
RESPIRATION RATE: 14 BRPM | DIASTOLIC BLOOD PRESSURE: 84 MMHG | WEIGHT: 280 LBS | BODY MASS INDEX: 45 KG/M2 | TEMPERATURE: 98 F | HEART RATE: 73 BPM | OXYGEN SATURATION: 97 % | HEIGHT: 66 IN | SYSTOLIC BLOOD PRESSURE: 131 MMHG

## 2025-01-17 DIAGNOSIS — Z30.41 ENCOUNTER FOR SURVEILLANCE OF CONTRACEPTIVE PILLS: ICD-10-CM

## 2025-01-17 DIAGNOSIS — R41.840 INATTENTION: ICD-10-CM

## 2025-01-17 DIAGNOSIS — N93.9 ABNORMAL UTERINE BLEEDING: Primary | ICD-10-CM

## 2025-01-17 RX ORDER — DEXTROAMPHETAMINE SACCHARATE, AMPHETAMINE ASPARTATE MONOHYDRATE, DEXTROAMPHETAMINE SULFATE AND AMPHETAMINE SULFATE 2.5; 2.5; 2.5; 2.5 MG/1; MG/1; MG/1; MG/1
10 CAPSULE, EXTENDED RELEASE ORAL DAILY
Status: CANCELLED | OUTPATIENT
Start: 2025-01-17

## 2025-01-17 RX ORDER — NORETHINDRONE ACETATE AND ETHINYL ESTRADIOL 1MG-20(21)
1 KIT ORAL DAILY
Qty: 90 TABLET | Refills: 1 | Status: SHIPPED | OUTPATIENT
Start: 2025-01-17 | End: 2025-01-22

## 2025-01-17 RX ORDER — NORETHINDRONE ACETATE AND ETHINYL ESTRADIOL 1MG-20(21)
1 KIT ORAL DAILY
Qty: 90 TABLET | Refills: 1 | Status: CANCELLED | OUTPATIENT
Start: 2025-01-17

## 2025-01-17 ASSESSMENT — ANXIETY QUESTIONNAIRES
IF YOU CHECKED OFF ANY PROBLEMS ON THIS QUESTIONNAIRE, HOW DIFFICULT HAVE THESE PROBLEMS MADE IT FOR YOU TO DO YOUR WORK, TAKE CARE OF THINGS AT HOME, OR GET ALONG WITH OTHER PEOPLE: SOMEWHAT DIFFICULT
6. BECOMING EASILY ANNOYED OR IRRITABLE: SEVERAL DAYS
7. FEELING AFRAID AS IF SOMETHING AWFUL MIGHT HAPPEN: SEVERAL DAYS
2. NOT BEING ABLE TO STOP OR CONTROL WORRYING: SEVERAL DAYS
GAD7 TOTAL SCORE: 7
1. FEELING NERVOUS, ANXIOUS, OR ON EDGE: SEVERAL DAYS
5. BEING SO RESTLESS THAT IT IS HARD TO SIT STILL: SEVERAL DAYS
GAD7 TOTAL SCORE: 7
3. WORRYING TOO MUCH ABOUT DIFFERENT THINGS: SEVERAL DAYS

## 2025-01-17 ASSESSMENT — PATIENT HEALTH QUESTIONNAIRE - PHQ9: 5. POOR APPETITE OR OVEREATING: SEVERAL DAYS

## 2025-01-17 NOTE — PROGRESS NOTES
Preceptor Attestation:    I discussed the patient with the resident and evaluated the patient in person. I have verified the content of the note, which accurately reflects my assessment of the patient and the plan of care.   Supervising Physician:  Wendy Balderrama MD.

## 2025-01-17 NOTE — PROGRESS NOTES
Assessment & Plan     Abnormal uterine bleeding  Encounter for surveillance of contraceptive pills  Intermenstrual and heavy menstrual bleeding since menarche, now well managed with combined oral contraception. Etiology undetermined; prior TVUS in 2023 showed no intrauterine mass, myomas, or cysts >2cm. Plans on having repeat TVUS done when she has insurance set. Will discuss more at next visit if she wishes. This should include ROS for hyperandrogenism and a CBC.   - norethindrone-ethinyl estradiol (JUNEL FE 1/20) 1-20 MG-MCG tablet; Take 1 tablet by mouth daily.    Inattention  Patient reported hxo ADHD, corroborated by positive adult self-report questionnaire today. Has been on adderall in the past which was helpful. She will send a record of her diagnosis from her Psychiatrist via [employer]. Then will have visit to complete the controlled substance agreement, UDS, and prescription. No scripts on file in the PDMP for the last year, which is consistent with her report.   - Follow up to establish ADHD visit        Return to establish for adhd.    Melanie Catalan is a 39 year old, presenting for the following health issues:  Follow Up (Med refill and birth control )        1/17/2025     9:27 AM   Additional Questions   Roomed by Steven         1/17/2025    Information    services provided? No     HPI   Frequent, heavy menstrual bleeding  Started after an IUD  Was on progesterone only for a year, bleeding through progesterone, so titrated onto the combined oral contraceptive     ADHD  - diagnosed by psychiatrist they were referred to by care counseling  - went to care counseling and saw medication management but then that person retired and it wan't a good therapeutic relationship was the new psychiatrist  - took adderall a few times per week  - symptoms not controlled because she ran out                    Objective    /84   Pulse 73   Temp 98  F (36.7  C) (Temporal)   Resp 14   Ht  "1.676 m (5' 6\")   Wt 127 kg (280 lb)   LMP  (LMP Unknown)   SpO2 97%   Breastfeeding No   BMI 45.19 kg/m    Body mass index is 45.19 kg/m .  Physical Exam   GENERAL: alert and no distress        [facility]  Gynecologic Ultrasound     Date of exam: 4/17/2023   \"Indication for exam:   1. Intermenstrual bleeding    2. Thickened endometrium      Requesting Provider: Rosario Wells NP     TECHNIQUE:   Transabdominal scan was not performed. Transvaginal scan was performed for   improved visualization of the pelvic structures.     FINDINGS:   The uterus is present, anteverted, without deviation, and measures 8.3 x   3.0 x 6.0 cm.   The myometrium is homogeneous.   There is no evidence of intrauterine masses.   The endometrial thickness is 2.2 mm.   There are no myomas noted.   The right ovary measures 2.7 X 1.7 X 1.7 cm and is normal in appearance.   The left ovary measures 2.4 X 1.5 X 1.4 cm and is normal in appearance.   Free fluid in the cul de sac: None \"        Signed Electronically by: Emani Branch MD    "

## 2025-01-22 DIAGNOSIS — Z30.41 ENCOUNTER FOR SURVEILLANCE OF CONTRACEPTIVE PILLS: ICD-10-CM

## 2025-01-22 RX ORDER — NORETHINDRONE ACETATE AND ETHINYL ESTRADIOL 1MG-20(21)
1 KIT ORAL DAILY
Qty: 90 TABLET | Refills: 1 | Status: SHIPPED | OUTPATIENT
Start: 2025-01-22

## 2025-01-30 DIAGNOSIS — Z30.41 ENCOUNTER FOR SURVEILLANCE OF CONTRACEPTIVE PILLS: ICD-10-CM

## 2025-01-30 RX ORDER — NORETHINDRONE ACETATE AND ETHINYL ESTRADIOL 1MG-20(21)
1 KIT ORAL DAILY
Qty: 90 TABLET | Refills: 1 | Status: SHIPPED | OUTPATIENT
Start: 2025-01-30

## 2025-03-27 ENCOUNTER — PATIENT OUTREACH (OUTPATIENT)
Dept: CARE COORDINATION | Facility: CLINIC | Age: 40
End: 2025-03-27

## 2025-03-29 ENCOUNTER — MYC REFILL (OUTPATIENT)
Dept: FAMILY MEDICINE | Facility: CLINIC | Age: 40
End: 2025-03-29

## 2025-03-29 DIAGNOSIS — F32.A DEPRESSION, UNSPECIFIED DEPRESSION TYPE: ICD-10-CM

## 2025-03-31 RX ORDER — DULOXETIN HYDROCHLORIDE 30 MG/1
30 CAPSULE, DELAYED RELEASE ORAL 2 TIMES DAILY
Qty: 360 CAPSULE | Refills: 0 | Status: SHIPPED | OUTPATIENT
Start: 2025-03-31

## 2025-03-31 NOTE — TELEPHONE ENCOUNTER
"Request for medication refill:    Medication Name:  DULoxetine (CYMBALTA) 30 MG capsule    Providers if patient needs an appointment and you are willing to give a one month supply please refill for one month and  send a MyChart using \".SMILLIMITEDREFILL\" .Or route chart to \"P [facility] \" . To call patient and inform of limited refill and providers request to make an appointment. (Giving one month refill in non controlled medications is strongly recommended before denial)    If refill has been denied, meaning absolutely no refills without visit, please complete the smart phrase \".SMIRXREFUSE\" and route it to the \"P [facility] MED REFILLS\"  pool to inform the patient and the pharmacy.    Delfin Hagan MA      "

## 2025-04-10 ENCOUNTER — PATIENT OUTREACH (OUTPATIENT)
Dept: CARE COORDINATION | Facility: CLINIC | Age: 40
End: 2025-04-10

## 2025-05-12 DIAGNOSIS — Z13.1 DIABETES MELLITUS SCREENING: ICD-10-CM

## 2025-05-12 DIAGNOSIS — E06.3 HYPOTHYROIDISM DUE TO HASHIMOTO'S THYROIDITIS: ICD-10-CM

## 2025-05-12 DIAGNOSIS — E06.3 HYPOTHYROIDISM DUE TO HASHIMOTO'S THYROIDITIS: Primary | ICD-10-CM

## 2025-05-12 RX ORDER — LEVOTHYROXINE SODIUM 137 UG/1
137 TABLET ORAL DAILY
Qty: 90 TABLET | Refills: 3 | Status: SHIPPED | OUTPATIENT
Start: 2025-05-12

## 2025-05-12 NOTE — TELEPHONE ENCOUNTER
"Request for medication refill:    Medication Name:     levothyroxine (SYNTHROID/LEVOTHROID) 137 MCG tablet       Providers if patient needs an appointment and you are willing to give a one month supply please refill for one month and  send a MyChart using \".SMILLIMITEDREFILL\" .Or route chart to \"P [facility] \" . To call patient and inform of limited refill and providers request to make an appointment. (Giving one month refill in non controlled medications is strongly recommended before denial)    If refill has been denied, meaning absolutely no refills without visit, please complete the smart phrase \".SMIRXREFUSE\" and route it to the \"P [facility] MED REFILLS\"  pool to inform the patient and the pharmacy.    Alexsandra Matias RN      "

## 2025-05-12 NOTE — TELEPHONE ENCOUNTER
[facility] phone call message- patient requesting a refill:    Full Medication Name:   levothyroxine (SYNTHROID/LEVOTHROID) 137 MCG tablet         Pharmacy confirmed as : Yes    Additional Comments: patient  requesting refill     OK to leave a message on voice mail? Yes    Primary language: English      needed? No    Call taken on May 12, 2025 at 11:08 AM by Didi Perea

## 2025-06-01 ENCOUNTER — HEALTH MAINTENANCE LETTER (OUTPATIENT)
Age: 40
End: 2025-06-01

## 2025-07-15 DIAGNOSIS — F32.A DEPRESSION, UNSPECIFIED DEPRESSION TYPE: ICD-10-CM

## 2025-07-15 NOTE — TELEPHONE ENCOUNTER
"Request for medication refill:    Medication Name: DULoxetine (CYMBALTA) 30 MG capsule     Providers if patient needs an appointment and you are willing to give a one month supply please refill for one month and  send a MyChart using \".SMILLIMITEDREFILL\" .Or route chart to \"P [facility] \" . And use the phrase \" SMIRXFOLLOWUP\"To call patient and inform of limited refill and providers request to make an appointment. (Giving one month refill in non controlled medications is strongly recommended before denial)    If refill has been denied, meaning absolutely no refills without visit, please complete the smart phrase \".SMIRXREFUSE\" and route it to the \"P [facility] MED REFILLS\"  pool to inform the patient and the pharmacy.    Delfin Hagan MA      "

## 2025-07-16 RX ORDER — DULOXETIN HYDROCHLORIDE 30 MG/1
30 CAPSULE, DELAYED RELEASE ORAL 2 TIMES DAILY
Qty: 360 CAPSULE | Refills: 0 | Status: SHIPPED | OUTPATIENT
Start: 2025-07-16

## 2025-08-13 DIAGNOSIS — Z30.41 ENCOUNTER FOR SURVEILLANCE OF CONTRACEPTIVE PILLS: ICD-10-CM

## 2025-08-13 RX ORDER — NORETHINDRONE ACETATE AND ETHINYL ESTRADIOL 1MG-20(21)
1 KIT ORAL DAILY
Qty: 90 TABLET | Refills: 1 | Status: SHIPPED | OUTPATIENT
Start: 2025-08-13